# Patient Record
Sex: MALE | Race: WHITE | Employment: OTHER | URBAN - METROPOLITAN AREA
[De-identification: names, ages, dates, MRNs, and addresses within clinical notes are randomized per-mention and may not be internally consistent; named-entity substitution may affect disease eponyms.]

---

## 2017-08-25 ENCOUNTER — HOSPITAL ENCOUNTER (EMERGENCY)
Age: 47
Discharge: HOME | End: 2017-08-25
Attending: FAMILY MEDICINE

## 2017-08-25 VITALS
SYSTOLIC BLOOD PRESSURE: 141 MMHG | BODY MASS INDEX: 35.68 KG/M2 | TEMPERATURE: 97.7 F | WEIGHT: 293 LBS | OXYGEN SATURATION: 97 % | RESPIRATION RATE: 18 BRPM | HEIGHT: 76 IN | HEART RATE: 67 BPM | DIASTOLIC BLOOD PRESSURE: 95 MMHG

## 2017-08-25 DIAGNOSIS — J20.9 ACUTE BRONCHITIS, UNSPECIFIED ORGANISM: Primary | ICD-10-CM

## 2017-08-25 RX ORDER — BENZONATATE 200 MG/1
200 CAPSULE ORAL
Qty: 30 CAP | Refills: 0 | Status: SHIPPED | OUTPATIENT
Start: 2017-08-25 | End: 2018-04-17

## 2017-08-25 RX ORDER — AZITHROMYCIN 250 MG/1
TABLET, FILM COATED ORAL
Qty: 6 TAB | Refills: 0 | Status: SHIPPED | OUTPATIENT
Start: 2017-08-25 | End: 2018-04-17

## 2017-08-25 NOTE — UC PROVIDER NOTE
Patient is a 52 y.o. male presenting with cough. The history is provided by the patient. Cough   This is a new problem. Episode onset: 6 days ago. The problem occurs every few minutes. The problem has been gradually worsening. The cough is productive of sputum. There has been no fever. Associated symptoms include rhinorrhea and sore throat. Pertinent negatives include no chest pain, no chills, no sweats, no eye redness, no ear congestion, no ear pain, no headaches, no myalgias, no shortness of breath, no wheezing, no nausea and no vomiting. He has tried decongestants for the symptoms. The treatment provided no relief. He is not a smoker. History reviewed. No pertinent past medical history. History reviewed. No pertinent surgical history. History reviewed. No pertinent family history. Social History     Social History    Marital status: SINGLE     Spouse name: N/A    Number of children: N/A    Years of education: N/A     Occupational History    Not on file. Social History Main Topics    Smoking status: Never Smoker    Smokeless tobacco: Never Used    Alcohol use Not on file    Drug use: Not on file    Sexual activity: Not on file     Other Topics Concern    Not on file     Social History Narrative    No narrative on file                ALLERGIES: Review of patient's allergies indicates no known allergies. Review of Systems   Constitutional: Negative for chills and fever. HENT: Positive for congestion, rhinorrhea and sore throat. Negative for ear pain. Eyes: Negative for redness. Respiratory: Positive for cough. Negative for shortness of breath and wheezing. Cardiovascular: Negative for chest pain and palpitations. Gastrointestinal: Negative for nausea and vomiting. Musculoskeletal: Negative for myalgias. Skin: Negative for rash. Neurological: Negative for dizziness and headaches. Hematological: Negative for adenopathy.        Vitals:    08/25/17 0823   BP: (!) 141/95   Pulse: 67   Resp: 18   Temp: 97.7 °F (36.5 °C)   SpO2: 97%   Weight: 132.9 kg (293 lb)   Height: 6' 4\" (1.93 m)       Physical Exam   Constitutional: He appears well-developed and well-nourished. No distress. HENT:   Right Ear: Tympanic membrane, external ear and ear canal normal.   Left Ear: Tympanic membrane, external ear and ear canal normal.   Nose: Rhinorrhea present. Right sinus exhibits no maxillary sinus tenderness and no frontal sinus tenderness. Left sinus exhibits no maxillary sinus tenderness and no frontal sinus tenderness. Mouth/Throat: Mucous membranes are normal. Posterior oropharyngeal edema and posterior oropharyngeal erythema present. No oropharyngeal exudate or tonsillar abscesses. Cardiovascular: Normal rate, regular rhythm and normal heart sounds. Pulmonary/Chest: Effort normal and breath sounds normal. No respiratory distress. He has no wheezes. He has no rales. Lymphadenopathy:     He has no cervical adenopathy. Neurological: He is alert. Skin: He is not diaphoretic. Psychiatric: He has a normal mood and affect. His behavior is normal. Judgment and thought content normal.   Nursing note and vitals reviewed. MDM     Differential Diagnosis; Clinical Impression; Plan:     CLINICAL IMPRESSION:  Acute bronchitis, unspecified organism  (primary encounter diagnosis)    Plan:  1. Zpak  2. Tessalon prn  3. PCP if no improvement  Risk of Significant Complications, Morbidity, and/or Mortality:   Presenting problems: Moderate  Management options:   Moderate  Progress:   Patient progress:  Stable      Procedures

## 2017-08-25 NOTE — DISCHARGE INSTRUCTIONS
Bronchitis: Care Instructions  Your Care Instructions    Bronchitis is inflammation of the bronchial tubes, which carry air to the lungs. The tubes swell and produce mucus, or phlegm. The mucus and inflamed bronchial tubes make you cough. You may have trouble breathing. Most cases of bronchitis are caused by viruses like those that cause colds. Antibiotics usually do not help and they may be harmful. Bronchitis usually develops rapidly and lasts about 2 to 3 weeks in otherwise healthy people. Follow-up care is a key part of your treatment and safety. Be sure to make and go to all appointments, and call your doctor if you are having problems. It's also a good idea to know your test results and keep a list of the medicines you take. How can you care for yourself at home? · Take all medicines exactly as prescribed. Call your doctor if you think you are having a problem with your medicine. · Get some extra rest.  · Take an over-the-counter pain medicine, such as acetaminophen (Tylenol), ibuprofen (Advil, Motrin), or naproxen (Aleve) to reduce fever and relieve body aches. Read and follow all instructions on the label. · Do not take two or more pain medicines at the same time unless the doctor told you to. Many pain medicines have acetaminophen, which is Tylenol. Too much acetaminophen (Tylenol) can be harmful. · Take an over-the-counter cough medicine that contains dextromethorphan to help quiet a dry, hacking cough so that you can sleep. Avoid cough medicines that have more than one active ingredient. Read and follow all instructions on the label. · Breathe moist air from a humidifier, hot shower, or sink filled with hot water. The heat and moisture will thin mucus so you can cough it out. · Do not smoke. Smoking can make bronchitis worse. If you need help quitting, talk to your doctor about stop-smoking programs and medicines. These can increase your chances of quitting for good.   When should you call for help? Call 911 anytime you think you may need emergency care. For example, call if:  · You have severe trouble breathing. Call your doctor now or seek immediate medical care if:  · You have new or worse trouble breathing. · You cough up dark brown or bloody mucus (sputum). · You have a new or higher fever. · You have a new rash. Watch closely for changes in your health, and be sure to contact your doctor if:  · You cough more deeply or more often, especially if you notice more mucus or a change in the color of your mucus. · You are not getting better as expected. Where can you learn more? Go to http://broderick-citlalli.info/. Enter H333 in the search box to learn more about \"Bronchitis: Care Instructions. \"  Current as of: March 25, 2017  Content Version: 11.3  © 6605-2863 [employer]. Care instructions adapted under license by [employer] (which disclaims liability or warranty for this information). If you have questions about a medical condition or this instruction, always ask your healthcare professional. [address] any warranty or liability for your use of this information.

## 2018-04-17 ENCOUNTER — HOSPITAL ENCOUNTER (INPATIENT)
Age: 48
LOS: 1 days | Discharge: HOME | DRG: 176 | End: 2018-04-18
Attending: EMERGENCY MEDICINE | Admitting: HOSPITALIST
Payer: SELF-PAY

## 2018-04-17 ENCOUNTER — APPOINTMENT (OUTPATIENT)
Dept: CT IMAGING | Age: 48
DRG: 176 | End: 2018-04-17
Attending: PHYSICIAN ASSISTANT
Payer: SELF-PAY

## 2018-04-17 ENCOUNTER — APPOINTMENT (OUTPATIENT)
Dept: CT IMAGING | Age: 48
DRG: 176 | End: 2018-04-17
Attending: EMERGENCY MEDICINE
Payer: SELF-PAY

## 2018-04-17 DIAGNOSIS — I82.4Z1 ACUTE DEEP VEIN THROMBOSIS OF DISTAL LEG, RIGHT (HCC): ICD-10-CM

## 2018-04-17 DIAGNOSIS — I26.99 OTHER ACUTE PULMONARY EMBOLISM WITHOUT ACUTE COR PULMONALE (HCC): ICD-10-CM

## 2018-04-17 DIAGNOSIS — D58.2 ELEVATED HEMOGLOBIN (HCC): ICD-10-CM

## 2018-04-17 LAB
ALBUMIN SERPL-MCNC: 3.6 G/DL (ref 3.5–5)
ALBUMIN/GLOB SERPL: 1 {RATIO} (ref 1.1–2.2)
ALP SERPL-CCNC: 89 U/L (ref 45–117)
ALT SERPL-CCNC: 27 U/L (ref 12–78)
ANION GAP SERPL CALC-SCNC: 7 MMOL/L (ref 5–15)
APPEARANCE UR: ABNORMAL
APTT PPP: 27.6 SEC (ref 22.1–32)
AST SERPL-CCNC: 23 U/L (ref 15–37)
BACTERIA URNS QL MICRO: NEGATIVE /HPF
BASOPHILS # BLD: 0 K/UL (ref 0–0.1)
BASOPHILS NFR BLD: 0 % (ref 0–1)
BILIRUB SERPL-MCNC: 2 MG/DL (ref 0.2–1)
BILIRUB UR QL CFM: NEGATIVE
BUN SERPL-MCNC: 8 MG/DL (ref 6–20)
BUN/CREAT SERPL: 10 (ref 12–20)
CALCIUM SERPL-MCNC: 9.1 MG/DL (ref 8.5–10.1)
CHLORIDE SERPL-SCNC: 107 MMOL/L (ref 97–108)
CO2 SERPL-SCNC: 27 MMOL/L (ref 21–32)
COLOR UR: ABNORMAL
CREAT SERPL-MCNC: 0.84 MG/DL (ref 0.7–1.3)
DIFFERENTIAL METHOD BLD: ABNORMAL
EOSINOPHIL # BLD: 0.1 K/UL (ref 0–0.4)
EOSINOPHIL NFR BLD: 1 % (ref 0–7)
EPITH CASTS URNS QL MICRO: ABNORMAL /LPF
ERYTHROCYTE [DISTWIDTH] IN BLOOD BY AUTOMATED COUNT: 15.7 % (ref 11.5–14.5)
FACT VIII ACT/NOR PPP: 244 % (ref 80–200)
GLOBULIN SER CALC-MCNC: 3.6 G/DL (ref 2–4)
GLUCOSE SERPL-MCNC: 96 MG/DL (ref 65–100)
GLUCOSE UR STRIP.AUTO-MCNC: NEGATIVE MG/DL
HCT VFR BLD AUTO: 55.9 % (ref 36.6–50.3)
HGB BLD-MCNC: 18.2 G/DL (ref 12.1–17)
HGB UR QL STRIP: ABNORMAL
HYALINE CASTS URNS QL MICRO: ABNORMAL /LPF (ref 0–5)
IMM GRANULOCYTES # BLD: 0.1 K/UL (ref 0–0.04)
IMM GRANULOCYTES NFR BLD AUTO: 1 % (ref 0–0.5)
INR PPP: 1.1 (ref 0.9–1.1)
KETONES UR QL STRIP.AUTO: ABNORMAL MG/DL
LEUKOCYTE ESTERASE UR QL STRIP.AUTO: ABNORMAL
LYMPHOCYTES # BLD: 1 K/UL (ref 0.8–3.5)
LYMPHOCYTES NFR BLD: 10 % (ref 12–49)
MCH RBC QN AUTO: 29.5 PG (ref 26–34)
MCHC RBC AUTO-ENTMCNC: 32.6 G/DL (ref 30–36.5)
MCV RBC AUTO: 90.6 FL (ref 80–99)
MONOCYTES # BLD: 1.1 K/UL (ref 0–1)
MONOCYTES NFR BLD: 11 % (ref 5–13)
NEUTS SEG # BLD: 7.8 K/UL (ref 1.8–8)
NEUTS SEG NFR BLD: 77 % (ref 32–75)
NITRITE UR QL STRIP.AUTO: NEGATIVE
NRBC # BLD: 0 K/UL (ref 0–0.01)
NRBC BLD-RTO: 0 PER 100 WBC
PH UR STRIP: 7 [PH] (ref 5–8)
PLATELET # BLD AUTO: 168 K/UL (ref 150–400)
PMV BLD AUTO: 10.1 FL (ref 8.9–12.9)
POTASSIUM SERPL-SCNC: 4.2 MMOL/L (ref 3.5–5.1)
PROT SERPL-MCNC: 7.2 G/DL (ref 6.4–8.2)
PROT UR STRIP-MCNC: NEGATIVE MG/DL
PROTHROMBIN TIME: 11.2 SEC (ref 9–11.1)
RBC # BLD AUTO: 6.17 M/UL (ref 4.1–5.7)
RBC #/AREA URNS HPF: ABNORMAL /HPF (ref 0–5)
SODIUM SERPL-SCNC: 141 MMOL/L (ref 136–145)
SP GR UR REFRACTOMETRY: 1.02 (ref 1–1.03)
THERAPEUTIC RANGE,PTTT: NORMAL SECS (ref 58–77)
UR CULT HOLD, URHOLD: NORMAL
UROBILINOGEN UR QL STRIP.AUTO: 1 EU/DL (ref 0.2–1)
WBC # BLD AUTO: 10.2 K/UL (ref 4.1–11.1)
WBC URNS QL MICRO: ABNORMAL /HPF (ref 0–4)

## 2018-04-17 PROCEDURE — 81241 F5 GENE: CPT | Performed by: HOSPITALIST

## 2018-04-17 PROCEDURE — 81001 URINALYSIS AUTO W/SCOPE: CPT | Performed by: PHYSICIAN ASSISTANT

## 2018-04-17 PROCEDURE — 74011250636 HC RX REV CODE- 250/636: Performed by: HOSPITALIST

## 2018-04-17 PROCEDURE — 71275 CT ANGIOGRAPHY CHEST: CPT

## 2018-04-17 PROCEDURE — C8929 TTE W OR WO FOL WCON,DOPPLER: HCPCS

## 2018-04-17 PROCEDURE — 85610 PROTHROMBIN TIME: CPT | Performed by: EMERGENCY MEDICINE

## 2018-04-17 PROCEDURE — 82668 ASSAY OF ERYTHROPOIETIN: CPT | Performed by: HOSPITALIST

## 2018-04-17 PROCEDURE — 86147 CARDIOLIPIN ANTIBODY EA IG: CPT | Performed by: HOSPITALIST

## 2018-04-17 PROCEDURE — 99284 EMERGENCY DEPT VISIT MOD MDM: CPT

## 2018-04-17 PROCEDURE — 85613 RUSSELL VIPER VENOM DILUTED: CPT | Performed by: HOSPITALIST

## 2018-04-17 PROCEDURE — 81240 F2 GENE: CPT | Performed by: HOSPITALIST

## 2018-04-17 PROCEDURE — 86146 BETA-2 GLYCOPROTEIN ANTIBODY: CPT | Performed by: HOSPITALIST

## 2018-04-17 PROCEDURE — 85730 THROMBOPLASTIN TIME PARTIAL: CPT | Performed by: EMERGENCY MEDICINE

## 2018-04-17 PROCEDURE — 85025 COMPLETE CBC W/AUTO DIFF WBC: CPT | Performed by: PHYSICIAN ASSISTANT

## 2018-04-17 PROCEDURE — 81270 JAK2 GENE: CPT | Performed by: HOSPITALIST

## 2018-04-17 PROCEDURE — 96372 THER/PROPH/DIAG INJ SC/IM: CPT

## 2018-04-17 PROCEDURE — 85240 CLOT FACTOR VIII AHG 1 STAGE: CPT | Performed by: HOSPITALIST

## 2018-04-17 PROCEDURE — 74011250636 HC RX REV CODE- 250/636: Performed by: EMERGENCY MEDICINE

## 2018-04-17 PROCEDURE — 96374 THER/PROPH/DIAG INJ IV PUSH: CPT

## 2018-04-17 PROCEDURE — 93971 EXTREMITY STUDY: CPT

## 2018-04-17 PROCEDURE — 93005 ELECTROCARDIOGRAM TRACING: CPT

## 2018-04-17 PROCEDURE — 74011250637 HC RX REV CODE- 250/637: Performed by: HOSPITALIST

## 2018-04-17 PROCEDURE — 36415 COLL VENOUS BLD VENIPUNCTURE: CPT | Performed by: HOSPITALIST

## 2018-04-17 PROCEDURE — 74011000258 HC RX REV CODE- 258: Performed by: EMERGENCY MEDICINE

## 2018-04-17 PROCEDURE — 74011250636 HC RX REV CODE- 250/636: Performed by: INTERNAL MEDICINE

## 2018-04-17 PROCEDURE — 65270000032 HC RM SEMIPRIVATE

## 2018-04-17 PROCEDURE — 74011250636 HC RX REV CODE- 250/636

## 2018-04-17 PROCEDURE — 85300 ANTITHROMBIN III ACTIVITY: CPT | Performed by: HOSPITALIST

## 2018-04-17 PROCEDURE — 74177 CT ABD & PELVIS W/CONTRAST: CPT

## 2018-04-17 PROCEDURE — 74011636320 HC RX REV CODE- 636/320: Performed by: EMERGENCY MEDICINE

## 2018-04-17 PROCEDURE — 80053 COMPREHEN METABOLIC PANEL: CPT | Performed by: PHYSICIAN ASSISTANT

## 2018-04-17 RX ORDER — ENOXAPARIN SODIUM 150 MG/ML
120 INJECTION SUBCUTANEOUS
Status: COMPLETED | OUTPATIENT
Start: 2018-04-17 | End: 2018-04-17

## 2018-04-17 RX ORDER — ENOXAPARIN SODIUM 150 MG/ML
120 INJECTION SUBCUTANEOUS EVERY 12 HOURS
Status: DISCONTINUED | OUTPATIENT
Start: 2018-04-18 | End: 2018-04-18

## 2018-04-17 RX ORDER — ACETAMINOPHEN 325 MG/1
650 TABLET ORAL
Status: DISCONTINUED | OUTPATIENT
Start: 2018-04-17 | End: 2018-04-18 | Stop reason: HOSPADM

## 2018-04-17 RX ORDER — ACETAMINOPHEN 500 MG
1000 TABLET ORAL
COMMUNITY
End: 2018-04-18

## 2018-04-17 RX ORDER — SODIUM CHLORIDE 0.9 % (FLUSH) 0.9 %
10-20 SYRINGE (ML) INJECTION
Status: COMPLETED | OUTPATIENT
Start: 2018-04-17 | End: 2018-04-17

## 2018-04-17 RX ORDER — IBUPROFEN 200 MG
200 TABLET ORAL
Status: DISCONTINUED | OUTPATIENT
Start: 2018-04-17 | End: 2018-04-17 | Stop reason: CLARIF

## 2018-04-17 RX ORDER — MORPHINE SULFATE 4 MG/ML
2 INJECTION, SOLUTION INTRAMUSCULAR; INTRAVENOUS
Status: DISCONTINUED | OUTPATIENT
Start: 2018-04-17 | End: 2018-04-17 | Stop reason: CLARIF

## 2018-04-17 RX ORDER — SODIUM CHLORIDE 0.9 % (FLUSH) 0.9 %
SYRINGE (ML) INJECTION
Status: COMPLETED
Start: 2018-04-17 | End: 2018-04-17

## 2018-04-17 RX ORDER — SODIUM CHLORIDE 0.9 % (FLUSH) 0.9 %
5-10 SYRINGE (ML) INJECTION AS NEEDED
Status: DISCONTINUED | OUTPATIENT
Start: 2018-04-17 | End: 2018-04-18 | Stop reason: HOSPADM

## 2018-04-17 RX ORDER — MORPHINE SULFATE 4 MG/ML
2 INJECTION, SOLUTION INTRAMUSCULAR; INTRAVENOUS
Status: DISCONTINUED | OUTPATIENT
Start: 2018-04-17 | End: 2018-04-18

## 2018-04-17 RX ORDER — SODIUM CHLORIDE 9 MG/ML
125 INJECTION, SOLUTION INTRAVENOUS CONTINUOUS
Status: DISCONTINUED | OUTPATIENT
Start: 2018-04-17 | End: 2018-04-18

## 2018-04-17 RX ORDER — MORPHINE SULFATE 10 MG/ML
4 INJECTION, SOLUTION INTRAMUSCULAR; INTRAVENOUS
Status: COMPLETED | OUTPATIENT
Start: 2018-04-17 | End: 2018-04-17

## 2018-04-17 RX ORDER — NALOXONE HYDROCHLORIDE 0.4 MG/ML
0.4 INJECTION, SOLUTION INTRAMUSCULAR; INTRAVENOUS; SUBCUTANEOUS AS NEEDED
Status: DISCONTINUED | OUTPATIENT
Start: 2018-04-17 | End: 2018-04-18 | Stop reason: HOSPADM

## 2018-04-17 RX ORDER — OXYCODONE HYDROCHLORIDE 5 MG/1
10 TABLET ORAL
Status: DISCONTINUED | OUTPATIENT
Start: 2018-04-17 | End: 2018-04-18 | Stop reason: HOSPADM

## 2018-04-17 RX ORDER — SODIUM CHLORIDE 0.9 % (FLUSH) 0.9 %
5-10 SYRINGE (ML) INJECTION EVERY 8 HOURS
Status: DISCONTINUED | OUTPATIENT
Start: 2018-04-17 | End: 2018-04-18 | Stop reason: HOSPADM

## 2018-04-17 RX ORDER — SODIUM CHLORIDE 0.9 % (FLUSH) 0.9 %
10 SYRINGE (ML) INJECTION
Status: COMPLETED | OUTPATIENT
Start: 2018-04-17 | End: 2018-04-17

## 2018-04-17 RX ORDER — ONDANSETRON 2 MG/ML
4 INJECTION INTRAMUSCULAR; INTRAVENOUS
Status: DISCONTINUED | OUTPATIENT
Start: 2018-04-17 | End: 2018-04-18 | Stop reason: HOSPADM

## 2018-04-17 RX ORDER — DOCUSATE SODIUM 100 MG/1
100 CAPSULE, LIQUID FILLED ORAL 2 TIMES DAILY
Status: DISCONTINUED | OUTPATIENT
Start: 2018-04-17 | End: 2018-04-18 | Stop reason: HOSPADM

## 2018-04-17 RX ORDER — MORPHINE SULFATE 4 MG/ML
4 INJECTION, SOLUTION INTRAMUSCULAR; INTRAVENOUS ONCE
Status: DISCONTINUED | OUTPATIENT
Start: 2018-04-17 | End: 2018-04-17 | Stop reason: CLARIF

## 2018-04-17 RX ORDER — MORPHINE SULFATE 10 MG/ML
2 INJECTION, SOLUTION INTRAMUSCULAR; INTRAVENOUS
Status: DISCONTINUED | OUTPATIENT
Start: 2018-04-17 | End: 2018-04-17

## 2018-04-17 RX ADMIN — MORPHINE SULFATE 2 MG: 10 INJECTION, SOLUTION INTRAMUSCULAR; INTRAVENOUS at 17:37

## 2018-04-17 RX ADMIN — ENOXAPARIN SODIUM 120 MG: 120 INJECTION SUBCUTANEOUS at 14:52

## 2018-04-17 RX ADMIN — OXYCODONE HYDROCHLORIDE 10 MG: 5 TABLET ORAL at 17:52

## 2018-04-17 RX ADMIN — MORPHINE SULFATE 4 MG: 10 INJECTION, SOLUTION INTRAMUSCULAR; INTRAVENOUS at 14:59

## 2018-04-17 RX ADMIN — SODIUM CHLORIDE 100 ML: 900 INJECTION, SOLUTION INTRAVENOUS at 13:28

## 2018-04-17 RX ADMIN — IOPAMIDOL 100 ML: 755 INJECTION, SOLUTION INTRAVENOUS at 13:28

## 2018-04-17 RX ADMIN — Medication 10 ML: at 13:28

## 2018-04-17 RX ADMIN — SODIUM CHLORIDE 125 ML/HR: 900 INJECTION, SOLUTION INTRAVENOUS at 17:46

## 2018-04-17 RX ADMIN — PERFLUTREN 1.5 ML: 6.52 INJECTION, SUSPENSION INTRAVENOUS at 16:18

## 2018-04-17 RX ADMIN — Medication 10 ML: at 16:19

## 2018-04-17 RX ADMIN — MORPHINE SULFATE 2 MG: 4 INJECTION, SOLUTION INTRAMUSCULAR; INTRAVENOUS at 21:18

## 2018-04-17 RX ADMIN — OXYCODONE HYDROCHLORIDE 10 MG: 5 TABLET ORAL at 23:01

## 2018-04-17 NOTE — ED PROVIDER NOTES
HPI Comments: 50 y.o. male with past medical history significant for kidney stones who presents from [facility] with chief complaint of right flank pain. Patient complains of 1 week of nausea, 2 days of right flank pain and constant right calf pain/swelling  Patient states pain is now pleuritic and became unbearable during the night last night. Patient also reports two weeks ago he had an episode of hemoptysis, coughed up a \"considerable amount of blood. \"  Patient had a couple additional episodes last night and this morning where cough was productive of specks of blood. Patient reports recent long travel driving from [place] to [place] approximately 1 month ago. Patient reports history of kidney stones but states this pain is different, higher and pleuritic. Patient denies urinary symptoms and URI symptoms. There are no other acute medical concerns at this time. Social hx: Nonsmoker  PCP: Son Kaur MD    Note written by Le Veliz. Ciara Ballard, as dictated by Jose Alejandro Contreras MD 12:42 PM      The history is provided by the patient. Past Medical History:   Diagnosis Date    Anxiety     Kidney stone        Past Surgical History:   Procedure Laterality Date    HX TONSILLECTOMY           History reviewed. No pertinent family history. Social History     Social History    Marital status: SINGLE     Spouse name: N/A    Number of children: N/A    Years of education: N/A     Occupational History    Not on file. Social History Main Topics    Smoking status: Never Smoker    Smokeless tobacco: Never Used    Alcohol use Yes    Drug use: No    Sexual activity: Not on file     Other Topics Concern    Not on file     Social History Narrative         ALLERGIES: Review of patient's allergies indicates no known allergies. Review of Systems   Constitutional: Negative for fever. HENT: Negative for congestion. Respiratory: Positive for cough, hemoptysis and shortness of breath. Gastrointestinal: Positive for nausea. Negative for vomiting. Genitourinary: Positive for flank pain. Negative for dysuria. Musculoskeletal: Positive for myalgias (right calf pain). All other systems reviewed and are negative. Vitals:    04/17/18 1204 04/17/18 1205   BP: (!) 161/117 (!) 156/108   Pulse: 90    Resp: 18    Temp: 97.9 °F (36.6 °C)    SpO2: 95%    Weight: 118.8 kg (262 lb)             Physical Exam   Constitutional: He is oriented to person, place, and time. He appears well-developed and well-nourished. No distress. HENT:   Head: Normocephalic and atraumatic. Eyes: Conjunctivae are normal. No scleral icterus. Neck: Neck supple. No tracheal deviation present. Cardiovascular: Normal rate, regular rhythm, normal heart sounds and intact distal pulses. Exam reveals no gallop and no friction rub. No murmur heard. Pulmonary/Chest: Effort normal and breath sounds normal. He has no wheezes. He has no rales. Abdominal: Soft. He exhibits no distension. There is no tenderness. There is no rebound and no guarding. Musculoskeletal: He exhibits no edema. Neurological: He is alert and oriented to person, place, and time. Skin: Skin is warm and dry. No rash noted. Psychiatric: He has a normal mood and affect. Nursing note and vitals reviewed. Note written by Gladis Chang.  Cece Richards, as dictated by Gisell Ku MD 12:48 PM       MDM  Number of Diagnoses or Management Options     Amount and/or Complexity of Data Reviewed  Clinical lab tests: ordered and reviewed  Tests in the radiology section of CPT®: ordered and reviewed  Tests in the medicine section of CPT®: ordered and reviewed  Discussion of test results with the performing providers: yes  Obtain history from someone other than the patient: yes  Discuss the patient with other providers: yes    Total critical care time spent exclusive of procedures: 34 minutes      ED Course       Procedures    2:03 PM  CTA shows pulmonary emboli in the right upper lobe and right lower lobe. Small right lower lobe pulmonary infarction. Will start anticoagulants and admit patient to the hospitalist.  Patient is hemodynamically stable. CONSULT NOTE:  2:30 PM Pb Polanco MD spoke with Dr. Chhaya Alexis, Consult for Hospitalist.  Discussed available diagnostic tests and clinical findings. Dr. Chhaya Alexis will admit patient. ED EKG interpretation:  Rhythm: normal sinus rhythm; and regular . Rate (approx.): 84; Voltage criteria for LVH. Note written by Eddie Gaitan.  Kulwant Varner, as dictated by Pb Polanco MD 2:41 PM

## 2018-04-17 NOTE — PROCEDURES
[facility]  *** FINAL REPORT ***    Name: Shira Velez  MRN: [id number]  : 1970  HIS Order #: [id number]  [id number] [facility] Visit #: [id number]  Date: 2018    TYPE OF TEST: Peripheral Venous Testing    REASON FOR TEST  Pain in limb, Pulmonary embolism    Right Leg:-  Deep venous thrombosis:           Yes  Proximal extent of thrombus:      Popliteal Above The Knee  Superficial venous thrombosis:    No  Deep venous insufficiency:        Not examined  Superficial venous insufficiency: Not examined      INTERPRETATION/FINDINGS  PROCEDURE:  Color duplex ultrasound imaging of lower extremity veins. FINDINGS:       Right:  Consistent with thrombosis involving the popliteal and  gastroc veins as demonstrated by vein non-compressibility, and by a  narrowing or occlusion of the flow channel on color Doppler imaging. The remaining segments;  common femoral, deep femoral, femoral,  posterior tibial and great saphenous are patent and without evidence  of thrombus; each is is fully compressible and there is no narrowing  of the flow channel on color Doppler imaging. Phasic flow is observed   in the common femoral vein. The peroneal vein was not visualized. There is limited visualization of the posteror tibial vein. Left:   The common femoral vein is patent and without evidence of   thrombus. Phasic flow is observed. This extremity was not otherwise   evaluated. IMPRESSION:  There is evidence of vein thrombosis, as described above. The ultrasound appearance is more consistent with an acute than a  chronic process. Due to limited visualization of the calf veins,  isolated calf vein thrombosis cannot be completely excluded. ADDITIONAL COMMENTS    I have personally reviewed the data relevant to the interpretation of  this  study.     TECHNOLOGIST: Ney Rodriguez RVT  Signed: 2018 03:46 PM    PHYSICIAN: Zulma Eldridge MD  Signed: 2018 06:49 AM

## 2018-04-17 NOTE — PROGRESS NOTES
Admission Medication Reconciliation:    Information obtained from: Patient    Significant PMH/Disease States:   Past Medical History:   Diagnosis Date    Anxiety     Kidney stone        Chief Complaint for this Admission:  Flank pain    Allergies:  Review of patient's allergies indicates no known allergies. Prior to Admission Medications:   Prior to Admission Medications   Prescriptions Last Dose Informant Patient Reported? Taking?   acetaminophen (TYLENOL) 500 mg tablet   Yes Yes   Sig: Take 1,000 mg by mouth every six (6) hours as needed for Pain. Facility-Administered Medications: None         Comments/Recommendations: Patient states that he does not take any scheduled medications. He does state that he took 1000 mg acetaminophen around 0130 on 4/17/18 for flank pain.

## 2018-04-17 NOTE — H&P
History & Physical    Date of admission: 4/17/2018    Patient name: iJm Flores  MRN: [id number]  YOB: 1970  Age: 50 y.o. Primary care provider:  Son Kaur MD     Source of Information: patient, medical records                              Chief complain: Rt ribcage pain    History of present illness  Jim Flores is a 50 y.o. male who presents with PMHx of Anxiety, kidney stone admitted for rt sided ribcage pain. Pt states that it started 1 week ago when he started to notice rt calf swelling and pain behind his knee. Pt then noticed over the weekend that he was starting to have rt rib cage pain and worse with deep inspiration. Pt also noted to have a cough with some blood tinge sputum. Pt also complains of nausea and dizziness as well. Pt states that they drove down from [place] to [place] in mid February but has felt normal until ~1 and 1 and 1/2 weeks ago. Pt states where he works he is constantly walk and states walk roughly 4-8 miles a day. Pt denies any chest pain, vomiting, fever, chills, diarrhea, abdominal pain. Pt denies any family hx of blood clot disorder, smoking, OTC or herbal supplements. Past Medical History:   Diagnosis Date    Anxiety     Kidney stone       Past Surgical History:   Procedure Laterality Date    HX TONSILLECTOMY       Prior to Admission medications    Medication Sig Start Date End Date Taking? Authorizing Provider   acetaminophen (TYLENOL) 500 mg tablet Take 1,000 mg by mouth every six (6) hours as needed for Pain. Yes Historical Provider     No Known Allergies   History reviewed. No pertinent family history. Family history reviewed and non-contributory.      Social history  Patient resides    Independently    X  With family care      Assisted living      SNF    Ambulates  X  Independently      With cane       Assisted walker         Alcohol history   X  None     Social Chronic   Smoking history  X  None     Former smoker     Current smoker     History   Smoking Status    Never Smoker   Smokeless Tobacco    Never Used       Code status  X  Full code     DNR/DNI        Code status discussed with the patient/caregivers. Full Code    Review of systems  The patient denies any fever, chills, chest pain, cough, congestion, recent illness, palpitations, or dysuria. A comprehensive review of systems was negative except for that written in the History of Present Illness. The remainder of the review of systems was reviewed and is noncontributory. Physical Examination   Visit Vitals    BP (!) 162/91    Pulse 85    Temp 97.9 °F (36.6 °C)    Resp 29    Wt 118.8 kg (262 lb)    SpO2 95%    BMI 31.89 kg/m2          O2 Device: Room air    General:  Alert, cooperative, no distress   Head:  Normocephalic, without obvious abnormality, atraumatic   Eyes:  Conjunctivae/corneas clear. PERRL, EOMs intact   E/N/M/T: Nares normal. Septum midline.  No nasal drainage or sinus tenderness  Lips, mucosa, and tongue normal   Teeth and gums normal  Clear oropharynx   Neck: No carotid bruit   Normal JVP   Lungs:   Symmetrical chest expansion and respiratory effort  Clear to auscultation bilaterally   Chest wall:  No tenderness to palpation    Heart:  Regular rhythm   Sounds normal; no murmur, click, rub or gallop   Abdomen:   Soft, no tenderness  Bowel sounds normal     Back: No CVA tenderness   Extremities: + rt calf swelling,tender behind knee, +blayne's sign on Rt   Pulses 2+ and symmetric all extremities   Skin: No rashes or ulcers   Musculo-      skeletal: Gait not tested  Normal symmetry, ROM, strength and tone   Neuro: Normal cranial nerves  Normal reflexes and sensation   Psych: Alert, oriented x3  Normal affect, judgement and insight   Geniturinary: deferred     Data Review    EKG:  NSR 84bpm, LVH    24 Hour Results:  Recent Results (from the past 24 hour(s))   CBC WITH AUTOMATED DIFF Collection Time: 04/17/18 12:12 PM   Result Value Ref Range    WBC 10.2 4.1 - 11.1 K/uL    RBC 6.17 (H) 4.10 - 5.70 M/uL    HGB 18.2 (H) 12.1 - 17.0 g/dL    HCT 55.9 (H) 36.6 - 50.3 %    MCV 90.6 80.0 - 99.0 FL    MCH 29.5 26.0 - 34.0 PG    MCHC 32.6 30.0 - 36.5 g/dL    RDW 15.7 (H) 11.5 - 14.5 %    PLATELET 562 633 - 014 K/uL    MPV 10.1 8.9 - 12.9 FL    NRBC 0.0 0  WBC    ABSOLUTE NRBC 0.00 0.00 - 0.01 K/uL    NEUTROPHILS 77 (H) 32 - 75 %    LYMPHOCYTES 10 (L) 12 - 49 %    MONOCYTES 11 5 - 13 %    EOSINOPHILS 1 0 - 7 %    BASOPHILS 0 0 - 1 %    IMMATURE GRANULOCYTES 1 (H) 0.0 - 0.5 %    ABS. NEUTROPHILS 7.8 1.8 - 8.0 K/UL    ABS. LYMPHOCYTES 1.0 0.8 - 3.5 K/UL    ABS. MONOCYTES 1.1 (H) 0.0 - 1.0 K/UL    ABS. EOSINOPHILS 0.1 0.0 - 0.4 K/UL    ABS. BASOPHILS 0.0 0.0 - 0.1 K/UL    ABS. IMM. GRANS. 0.1 (H) 0.00 - 0.04 K/UL    DF AUTOMATED     METABOLIC PANEL, COMPREHENSIVE    Collection Time: 04/17/18 12:12 PM   Result Value Ref Range    Sodium 141 136 - 145 mmol/L    Potassium 4.2 3.5 - 5.1 mmol/L    Chloride 107 97 - 108 mmol/L    CO2 27 21 - 32 mmol/L    Anion gap 7 5 - 15 mmol/L    Glucose 96 65 - 100 mg/dL    BUN 8 6 - 20 MG/DL    Creatinine 0.84 0.70 - 1.30 MG/DL    BUN/Creatinine ratio 10 (L) 12 - 20      GFR est AA >60 >60 ml/min/1.73m2    GFR est non-AA >60 >60 ml/min/1.73m2    Calcium 9.1 8.5 - 10.1 MG/DL    Bilirubin, total 2.0 (H) 0.2 - 1.0 MG/DL    ALT (SGPT) 27 12 - 78 U/L    AST (SGOT) 23 15 - 37 U/L    Alk.  phosphatase 89 45 - 117 U/L    Protein, total 7.2 6.4 - 8.2 g/dL    Albumin 3.6 3.5 - 5.0 g/dL    Globulin 3.6 2.0 - 4.0 g/dL    A-G Ratio 1.0 (L) 1.1 - 2.2     URINALYSIS W/MICROSCOPIC    Collection Time: 04/17/18 12:12 PM   Result Value Ref Range    Color DARK YELLOW      Appearance CLOUDY (A) CLEAR      Specific gravity 1.019 1.003 - 1.030      pH (UA) 7.0 5.0 - 8.0      Protein NEGATIVE  NEG mg/dL    Glucose NEGATIVE  NEG mg/dL    Ketone TRACE (A) NEG mg/dL    Blood SMALL (A) NEG Urobilinogen 1.0 0.2 - 1.0 EU/dL    Nitrites NEGATIVE  NEG      Leukocyte Esterase TRACE (A) NEG      WBC 0-4 0 - 4 /hpf    RBC 20-50 0 - 5 /hpf    Epithelial cells FEW FEW /lpf    Bacteria NEGATIVE  NEG /hpf    Hyaline cast 0-2 0 - 5 /lpf   URINE CULTURE HOLD SAMPLE    Collection Time: 04/17/18 12:12 PM   Result Value Ref Range    Urine culture hold        URINE ON HOLD IN MICROBIOLOGY DEPT FOR 3 DAYS. IF UNPRESERVED URINE IS SUBMITTED, IT CANNOT BE USED FOR ADDITIONAL TESTING AFTER 24 HRS, RECOLLECTION WILL BE REQUIRED. BILIRUBIN, CONFIRM    Collection Time: 04/17/18 12:12 PM   Result Value Ref Range    Bilirubin UA, confirm NEGATIVE  NEG     PROTHROMBIN TIME + INR    Collection Time: 04/17/18 12:12 PM   Result Value Ref Range    INR 1.1 0.9 - 1.1      Prothrombin time 11.2 (H) 9.0 - 11.1 sec   PTT    Collection Time: 04/17/18 12:12 PM   Result Value Ref Range    aPTT 27.6 22.1 - 32.0 sec    aPTT, therapeutic range     58.0 - 77.0 SECS   EKG, 12 LEAD, INITIAL    Collection Time: 04/17/18  2:37 PM   Result Value Ref Range    Ventricular Rate 84 BPM    Atrial Rate 84 BPM    P-R Interval 156 ms    QRS Duration 92 ms    Q-T Interval 352 ms    QTC Calculation (Bezet) 415 ms    Calculated P Axis 27 degrees    Calculated R Axis -10 degrees    Calculated T Axis 16 degrees    Diagnosis       Normal sinus rhythm  Voltage criteria for left ventricular hypertrophy  No previous ECGs available       Recent Labs      04/17/18   1212   WBC  10.2   HGB  18.2*   HCT  55.9*   PLT  168     Recent Labs      04/17/18   1212   NA  141   K  4.2   CL  107   CO2  27   GLU  96   BUN  8   CREA  0.84   CA  9.1   ALB  3.6   TBILI  2.0*   SGOT  23   ALT  27   INR  1.1       Imaging  CTA chest/abd/pel:  Positive pulmonary emboli in the right upper lobe and right lower lobe. Small right lower lobe pulmonary infarction.     IMPRESSION:  Bilateral nonobstructing renal calculi  Bilateral renal hypodensities, too small to further characterize. Right lower lobe pulmonary embolism with pulmonary infarction.     Assessment and Plan   Active Problems:    Pulmonary infarct ([address] (4/17/2018)      Acute pulmonary embolism (HCC) (4/17/2018)      Elevated hemoglobin (HCC) (4/17/2018)      Acute deep vein thrombosis of distal leg, right (HCC) (4/17/2018)      Acute Pulmonary Embolism with Pulmonary Infarct  - Appears to be unprovoked at this time  - Lovenox BID  - ECHO EF 60-65%,   - Pain mananagement  - IVF  - Hypercoagulable work up ordered    Acute DVT RLE   - on Lovenox    Elevated Hemoglobin, concern for Polycythemia  - Hematology consulted  - f/u EPO and Gabino-2 Mutation serology     Elevated BP without Dx of Hypertension  - possibly stress and pain induced  - monitor  - may have to treat if continues to be elevated  - will monitor for now      Diet: Regular  Activity: as tolerated   DVT prophylaxis: on Lovenox  Isolation precautions: none  Consultations: Hematology   Anticipated disposition: 1-2 days       Signed by: Marlyn Bonilla MD    April 17, 2018 at 5:27 PM

## 2018-04-17 NOTE — IP AVS SNAPSHOT
[address] 
[phone] Patient: Maria Esther Pavon MRN: [id number] KFT:7/7/4735 You are allergic to the following No active allergies Recent Documentation Weight BMI Smoking Status 118.8 kg 31.89 kg/m2 Never Smoker Unresulted Labs-Please follow up with your PCP about these lab tests Order Current Status ANTITHROMBIN III ACTIVITY In process BETA-2 GLYCOPROTEIN I AB, IGA In process BETA-2 GLYCOPROTEIN I ABS In process CARDIOLIPIN AB PANEL In process ERYTHROPOIETIN In process FACTOR II  DNA ANALYSIS In process FACTOR V LEIDEN In process JAK2 MUTATION ANALYSIS In process LUPUS ANTICOAGULANT PANEL W/ REFLEX In process Emergency Contacts  (Rel.) Home Phone Work Phone Mobile Phone Drew Rothman -- -- --  
 Veronique Nicholas (Spouse) -- -- [phone] About your hospitalization You were admitted on:  April 17, 2018 You last received care in the:  [facility] You were discharged on:  April 18, 2018 Why you were hospitalized Your primary diagnosis was:  Not on File Your diagnoses also included:  Pulmonary Infarct (Hcc), Acute Pulmonary Embolism (Hcc), Elevated Hemoglobin (Hcc), Acute Deep Vein Thrombosis Of Distal Leg, Right (Hcc) Providers Seen During Your Hospitalization Provider Specialty Primary office phone Dayami Gonzalez MD Emergency Medicine [phone] Titus Sullivan MD Internal Medicine [phone] Your Primary Care Physician (PCP) Primary Care Physician Office Phone Office Fax OTHER, PHYS ** None ** ** None ** Follow-up Information Follow up With Details Comments Contact Info [address] on 4/30/2018 New PCP appointment on Monday April 30,2018 @ 2:15 p.m. If patient is unable to attend, please call the office. Bright Kincaid [address] [place] [address] 
[phone] Deyanira Mir MD Schedule an appointment as soon as possible for a visit in 1 week [address] [facility] 209 [address] [place] [address] 
[phone] [facility]    [phone]  
 
call with any questions concerning the Care Card My Medications STOP taking these medications   
 acetaminophen 500 mg tablet Commonly known as:  TYLENOL  
   
  
  
TAKE these medications as instructed Instructions Each Dose to Equal  
 Morning Noon Evening Bedtime * apixaban 5 mg (74 tabs) starter pack Commonly known as:  Pritchett Sheets Your last dose was: Your next dose is: Take 10 mg (two 5 mg tablets) by mouth twice a day for 7 days  Followed by 5 mg (one 5 mg tablet) by mouth twice a day * apixaban 5 mg tablet Commonly known as:  Pritchett Sheets Your last dose was: Your next dose is: Take 1 Tab by mouth two (2) times a day. Begin this prescription once you have completed your original dosepak. Discuss the need for additional treatment with Heamtology 5 mg  
    
   
   
   
  
 oxyCODONE IR 10 mg Tab immediate release tablet Commonly known as:  Mike Hinkle Your last dose was: Your next dose is: Take 1 Tab by mouth every four (4) hours as needed. Max Daily Amount: 60 mg.  
 10 mg  
    
   
   
   
  
 * Notice: This list has 2 medication(s) that are the same as other medications prescribed for you. Read the directions carefully, and ask your doctor or other care provider to review them with you. Where to Get Your Medications Information on where to get these meds will be given to you by the nurse or doctor. ! Ask your nurse or doctor about these medications  
  apixaban 5 mg (74 tabs) starter pack  
 apixaban 5 mg tablet  
 oxyCODONE IR 10 mg Tab immediate release tablet Discharge Instructions Discharge Instructions PATIENT ID: Devi Gardner MRN: [id number] YOB: 1970 DATE OF ADMISSION: 4/17/2018 12:23 PM   
DATE OF DISCHARGE: 4/18/2018 PRIMARY CARE PROVIDER: Son Kaur MD  
 
 
DISCHARGING PHYSICIAN: Debo Cat NP To contact this individual call [phone] and ask the  to page. If unavailable ask to be transferred the Adult Hospitalist Department. DISCHARGE DIAGNOSES DVT, PE with Pulmonary Infarct CONSULTATIONS: IP CONSULT TO HEMATOLOGY PROCEDURES/SURGERIES: * No surgery found * PENDING TEST RESULTS:  
At the time of discharge the following test results are still pending: na 
 
FOLLOW UP APPOINTMENTS:  
Follow-up Information Follow up With Details Comments Contact Info [address] [facility] Pam on 4/30/2018 New PCP appointment on Monday April 30,2018 @ 2:15 p.m. If patient is unable to attend, please call the office. [address] [place] [address] 
[phone] Yolie Brown MD Schedule an appointment as soon as possible for a visit in 1 week [address] 
[phone] ADDITIONAL CARE RECOMMENDATIONS:  
1.I have prescribed you Eliquis, which is a blood thinner for your leg DVT and your PE. 2. You will need to follow the instructions in the dosepak for the first month and then start the second and third month. Discuss length of treatment with the hematologist. 
3. You will need to follow up with hematology for follow up of your lab work up. 4. You are at risk for bleeding on a anticoagulant medication. Avoid trauma or high risk behaviors while on this medicine 5. Pain medication has been ordered DIET: resume ACTIVITY: resume WOUND CARE: na 
 
EQUIPMENT needed: prescriptions and Eliquis coupon book DISCHARGE MEDICATIONS: 
 See Medication Reconciliation Form · It is important that you take the medication exactly as they are prescribed. · Keep your medication in the bottles provided by the pharmacist and keep a list of the medication names, dosages, and times to be taken in your wallet. · Do not take other medications without consulting your doctor. NOTIFY YOUR PHYSICIAN FOR ANY OF THE FOLLOWING:  
Fever over 101 degrees for 24 hours. Chest pain, shortness of breath, fever, chills, nausea, vomiting, diarrhea, change in mentation, falling, weakness, bleeding. Severe pain or pain not relieved by medications. Or, any other signs or symptoms that you may have questions about. DISPOSITION: 
  Home With: 
 OT  PT  HH  RN  
  
 SNF/Inpatient Rehab/LTAC  
x Independent/assisted living Hospice Other: CDMP Checked:  
Yes x Signed:  
Mansi Ayala NP 
4/18/2018 10:46 AM 
 
Discharge Orders None [employer] Announcement We are excited to announce that we are making your provider's discharge notes available to you in [employer]. You will see these notes when they are completed and signed by the physician that discharged you from your recent hospital stay. If you have any questions or concerns about any information you see in [employer], please call the Health Information Department where you were seen or reach out to your Primary Care Provider for more information about your plan of care. Introducing [facility]! Viviana Green introduces [employer] patient portal. Now you can access parts of your medical record, email your doctor's office, and request medication refills online. 1. In your internet browser, go to [email or web address] 2. Click on the First Time User? Click Here link in the Sign In box. You will see the New Member Sign Up page. 3. Enter your [employer] Access Code exactly as it appears below. You will not need to use this code after youve completed the sign-up process. If you do not sign up before the expiration date, you must request a new code. · [employer] Access Code: [email or web address] Expires: 7/16/2018 12:00 PM 
 
4. Enter the last four digits of your Social Security Number (xxxx) and Date of Birth (mm/dd/yyyy) as indicated and click Submit. You will be taken to the next sign-up page. 5. Create a [employer] ID. This will be your [employer] login ID and cannot be changed, so think of one that is secure and easy to remember. 6. Create a [employer] password. You can change your password at any time. 7. Enter your Password Reset Question and Answer. This can be used at a later time if you forget your password. 8. Enter your e-mail address. You will receive e-mail notification when new information is available in [address] 9. Click Sign Up. You can now view and download portions of your medical record. 10. Click the Download Summary menu link to download a portable copy of your medical information. If you have questions, please visit the Frequently Asked Questions section of the [employer] website. Remember, [employer] is NOT to be used for urgent needs. For medical emergencies, dial 911. Now available from your iPhone and Android! General Information Please provide this summary of care documentation to your next provider. Patient Signature:  ____________________________________________________________ Date:  ____________________________________________________________  
  
Wilfrido Cart Provider Signature:  ____________________________________________________________ Date:  ____________________________________________________________

## 2018-04-17 NOTE — ED NOTES

## 2018-04-17 NOTE — CONSULTS
[facility] at [address]  [address] [place] [address], [address] [place]: [phone]  F: [phone]    Reason for Visit:   Diaz Gerardo is a 50 y.o. male who is seen in consultation at the request of Dr. Marita Melendez for evaluation of DVT/PE. Treatment History:   · CTA 4/17/18 with pulmonary emboli of right lung  · CT of abd/pelvis 4/17/18 with bilateral non-obstructing renal calculi, no other acute abnormality. · Doppler right leg 4/17/18 consistent with acute DVT right popliteal vein. History of Present Illness:   Mr. Swetha Aguirre is 50 y.o. male with no significant past medical history who presented to ED with c/o one week history of right sided flank pain which worsened in severity over the last 3 days. Associated with nausea, low appetite. Has also noticed in early Feb right leg discomfort behind right knee with associated leg weakness. Denies shortness of breath, chest pain, dizziness or headache. No recent falls, injury, broken bones or trauma. No periods of prolonged immobilization. Does endorse pain in right side of chest with deep breathing. Travels for work frequently. Has flown back and forth from [facility] (4 hours total travel time) every 2 weeks from August to Feb. Around Feb 19th he drove back and forth from [place]. Noticed pain in right leg prior to [place] trip. Active with work, works full time. Walks 4-9 miles per day on the job. Has twin brother who is healthy. Sister, age 48, has pacemaker and cardiac illness thought to be related to heavy smoking. Has 3 children ages 7-14 who are all healthy. No personal history of blood clots, easily bruising or bleeding. Denies blood in urine or stool. No change in bowel habits or recent weight loss. No dark/black or blood in stools. History of tonsillectomy as a child without incident. Drinks alcohol about 2-3 glasses of wine 2-3 days a week. Denies active smoking or illicit drug use.  Denies use of testosterone replacement therapy. Past Medical History:   Diagnosis Date    Anxiety     Kidney stone       Past Surgical History:   Procedure Laterality Date    HX TONSILLECTOMY        Social History   Substance Use Topics    Smoking status: Never Smoker    Smokeless tobacco: Never Used    Alcohol use Yes      History reviewed. No pertinent family history. Current Facility-Administered Medications   Medication Dose Route Frequency    sodium chloride (NS) flush 5-10 mL  5-10 mL IntraVENous Q8H    sodium chloride (NS) flush 5-10 mL  5-10 mL IntraVENous PRN    0.9% sodium chloride infusion  125 mL/hr IntraVENous CONTINUOUS    enoxaparin (LOVENOX) injection 120 mg  1 mg/kg SubCUTAneous Q12H    acetaminophen (TYLENOL) tablet 650 mg  650 mg Oral Q6H PRN    oxyCODONE IR (ROXICODONE) tablet 10 mg  10 mg Oral Q4H PRN    morphine injection 2 mg  2 mg IntraVENous Q6H PRN    naloxone (NARCAN) injection 0.4 mg  0.4 mg IntraVENous PRN    ondansetron (ZOFRAN) injection 4 mg  4 mg IntraVENous Q4H PRN    docusate sodium (COLACE) capsule 100 mg  100 mg Oral BID    perflutren lipid microspheres (DEFINITY) in NS bolus IV  1.5 mL IntraVENous RAD ONCE     Current Outpatient Prescriptions   Medication Sig    acetaminophen (TYLENOL) 500 mg tablet Take 1,000 mg by mouth every six (6) hours as needed for Pain. No Known Allergies     Review of Systems: A complete review of systems was obtained, negative except as described above.     Physical Exam:     Visit Vitals    BP (!) 162/91    Pulse 85    Temp 97.9 °F (36.6 °C)    Resp 29    Wt 262 lb (118.8 kg)    SpO2 95%    BMI 31.89 kg/m2     ECOG PS: 0  General: No distress  Eyes: PERRLA, anicteric sclerae  HENT: Atraumatic, OP clear  Neck: Supple  Lymphatic: No cervical, supraclavicular adenopathy  Respiratory: CTAB, normal respiratory effort  CV: Normal rate, regular rhythm, no peripheral edema noted  GI: Soft, nontender, nondistended  MS: Digits without clubbing or cyanosis. Skin: No rashes, ecchymoses, or petechiae. Normal temperature, turgor, and texture. Psych: Alert, oriented, appropriate affect, normal judgment/insight    Results:     Lab Results   Component Value Date/Time    WBC 10.2 04/17/2018 12:12 PM    HGB 18.2 (H) 04/17/2018 12:12 PM    HCT 55.9 (H) 04/17/2018 12:12 PM    PLATELET 422 34/31/1812 12:12 PM    MCV 90.6 04/17/2018 12:12 PM    ABS. NEUTROPHILS 7.8 04/17/2018 12:12 PM     Lab Results   Component Value Date/Time    Sodium 141 04/17/2018 12:12 PM    Potassium 4.2 04/17/2018 12:12 PM    Chloride 107 04/17/2018 12:12 PM    CO2 27 04/17/2018 12:12 PM    Glucose 96 04/17/2018 12:12 PM    BUN 8 04/17/2018 12:12 PM    Creatinine 0.84 04/17/2018 12:12 PM    GFR est AA >60 04/17/2018 12:12 PM    GFR est non-AA >60 04/17/2018 12:12 PM    Calcium 9.1 04/17/2018 12:12 PM     Lab Results   Component Value Date/Time    Bilirubin, total 2.0 (H) 04/17/2018 12:12 PM    ALT (SGPT) 27 04/17/2018 12:12 PM    AST (SGOT) 23 04/17/2018 12:12 PM    Alk. phosphatase 89 04/17/2018 12:12 PM    Protein, total 7.2 04/17/2018 12:12 PM    Albumin 3.6 04/17/2018 12:12 PM    Globulin 3.6 04/17/2018 12:12 PM     Records reviewed and summarized above. Radiology report(s) reviewed above. Assessment:   1) DVT/PE    Reviewed results of CTs and doppler with patient. Acute DVT of right leg and two PE of right lung. Leading up to the blood clot, he had recent history travel. Short duration, but frequent travel. No surgery, no trauma, or immobilization. This may be considered a provoked venous thromboembolism. though travel generally is a weak trigger. Per the 2012 ACCP guidelines for Antithrombotic Therapy for VTE Disease, will continue with 3 months of anticoagulation (if not longer) as long as the benefits of anticoagulation outweigh the risks and as long as his pending hypercoag work up is unrevealing. CT CAP was reviewed.  No obvious malignancy    2) Family history of sudden death  Family  unexpectedly and sudden at age of 47. Hypercoagulable work up pending  Protein C and S after 3 months of AC    3) Erythrocytosis. Likely secondary to acute PE and resulting hypoxia  No prior counts to compare with  Reasonable to r/o MPN  Noted that JAK2 and Erythropoietin has been ordered by Dr. Nya Gallegos     4) Hematuria, microscopic    Due to bilateral renal calculi. This can also explain right flank pain and nausea. 5) Pleuritic pain with deep breathing and right calf pain. Due to PE/DVT. Expect improvement on anticoagulation. Continue PRN medications, short course. Plan:     · May transition to Eliquis 10 mg BID X 7 days and then 5 mg BID for atleast 3 months  · Patient does not have insurance. Will need case management for assistance with medication management post discharge. Needs a plan for ability to obtain medications prior to discharge. · Noted hypercoagulable work up is sent  · JAK2 pending  · Continue primary health maintenance with PCP follow up-colonoscopy at age 48, flu vaccine yearly and yearly prostate exams. · Follow up with hematology in 3 months    I appreciate the opportunity to participate in Mr. Dempsey Lg Duran for discharge from hematology perspective.      Patient was seen with Santiago Barth NP        Signed By: Mustapha Burgess MD

## 2018-04-17 NOTE — ED TRIAGE NOTES
C/o right flank pain, right leg pain, coughing up specs of blood, and nausea. Sent by Pt. First, was told he might have kidney stone.

## 2018-04-18 VITALS
BODY MASS INDEX: 31.89 KG/M2 | WEIGHT: 262 LBS | SYSTOLIC BLOOD PRESSURE: 136 MMHG | TEMPERATURE: 98.4 F | OXYGEN SATURATION: 94 % | RESPIRATION RATE: 16 BRPM | DIASTOLIC BLOOD PRESSURE: 79 MMHG | HEART RATE: 79 BPM

## 2018-04-18 LAB
ANION GAP SERPL CALC-SCNC: 6 MMOL/L (ref 5–15)
ATRIAL RATE: 84 BPM
BASOPHILS # BLD: 0 K/UL (ref 0–0.1)
BASOPHILS NFR BLD: 0 % (ref 0–1)
BUN SERPL-MCNC: 8 MG/DL (ref 6–20)
BUN/CREAT SERPL: 12 (ref 12–20)
CALCIUM SERPL-MCNC: 8.6 MG/DL (ref 8.5–10.1)
CALCULATED P AXIS, ECG09: 27 DEGREES
CALCULATED R AXIS, ECG10: -10 DEGREES
CALCULATED T AXIS, ECG11: 16 DEGREES
CHLORIDE SERPL-SCNC: 105 MMOL/L (ref 97–108)
CO2 SERPL-SCNC: 26 MMOL/L (ref 21–32)
CREAT SERPL-MCNC: 0.68 MG/DL (ref 0.7–1.3)
DIAGNOSIS, 93000: NORMAL
DIFFERENTIAL METHOD BLD: ABNORMAL
EOSINOPHIL # BLD: 0.2 K/UL (ref 0–0.4)
EOSINOPHIL NFR BLD: 2 % (ref 0–7)
ERYTHROCYTE [DISTWIDTH] IN BLOOD BY AUTOMATED COUNT: 15.1 % (ref 11.5–14.5)
GLUCOSE SERPL-MCNC: 108 MG/DL (ref 65–100)
HCT VFR BLD AUTO: 50.1 % (ref 36.6–50.3)
HGB BLD-MCNC: 15.8 G/DL (ref 12.1–17)
IMM GRANULOCYTES # BLD: 0.1 K/UL (ref 0–0.04)
IMM GRANULOCYTES NFR BLD AUTO: 1 % (ref 0–0.5)
LYMPHOCYTES # BLD: 1.3 K/UL (ref 0.8–3.5)
LYMPHOCYTES NFR BLD: 14 % (ref 12–49)
MAGNESIUM SERPL-MCNC: 1.8 MG/DL (ref 1.6–2.4)
MCH RBC QN AUTO: 29.1 PG (ref 26–34)
MCHC RBC AUTO-ENTMCNC: 31.5 G/DL (ref 30–36.5)
MCV RBC AUTO: 92.3 FL (ref 80–99)
MONOCYTES # BLD: 1.1 K/UL (ref 0–1)
MONOCYTES NFR BLD: 12 % (ref 5–13)
NEUTS SEG # BLD: 6.6 K/UL (ref 1.8–8)
NEUTS SEG NFR BLD: 71 % (ref 32–75)
NRBC # BLD: 0 K/UL (ref 0–0.01)
NRBC BLD-RTO: 0 PER 100 WBC
P-R INTERVAL, ECG05: 156 MS
PHOSPHATE SERPL-MCNC: 3.8 MG/DL (ref 2.6–4.7)
PLATELET # BLD AUTO: 151 K/UL (ref 150–400)
PMV BLD AUTO: 9.7 FL (ref 8.9–12.9)
POTASSIUM SERPL-SCNC: 3.8 MMOL/L (ref 3.5–5.1)
Q-T INTERVAL, ECG07: 352 MS
QRS DURATION, ECG06: 92 MS
QTC CALCULATION (BEZET), ECG08: 415 MS
RBC # BLD AUTO: 5.43 M/UL (ref 4.1–5.7)
SODIUM SERPL-SCNC: 137 MMOL/L (ref 136–145)
VENTRICULAR RATE, ECG03: 84 BPM
WBC # BLD AUTO: 9.3 K/UL (ref 4.1–11.1)

## 2018-04-18 PROCEDURE — 84100 ASSAY OF PHOSPHORUS: CPT | Performed by: HOSPITALIST

## 2018-04-18 PROCEDURE — 74011250636 HC RX REV CODE- 250/636: Performed by: HOSPITALIST

## 2018-04-18 PROCEDURE — 83735 ASSAY OF MAGNESIUM: CPT | Performed by: HOSPITALIST

## 2018-04-18 PROCEDURE — 85025 COMPLETE CBC W/AUTO DIFF WBC: CPT | Performed by: HOSPITALIST

## 2018-04-18 PROCEDURE — 80048 BASIC METABOLIC PNL TOTAL CA: CPT | Performed by: HOSPITALIST

## 2018-04-18 PROCEDURE — 74011250637 HC RX REV CODE- 250/637: Performed by: HOSPITALIST

## 2018-04-18 PROCEDURE — 74011250637 HC RX REV CODE- 250/637: Performed by: NURSE PRACTITIONER

## 2018-04-18 PROCEDURE — 74011250636 HC RX REV CODE- 250/636: Performed by: INTERNAL MEDICINE

## 2018-04-18 PROCEDURE — 36415 COLL VENOUS BLD VENIPUNCTURE: CPT | Performed by: HOSPITALIST

## 2018-04-18 RX ORDER — OXYCODONE HYDROCHLORIDE 10 MG/1
10 TABLET ORAL
Qty: 30 TAB | Refills: 0 | Status: SHIPPED | OUTPATIENT
Start: 2018-04-18 | End: 2018-04-27 | Stop reason: SDUPTHER

## 2018-04-18 RX ADMIN — OXYCODONE HYDROCHLORIDE 10 MG: 5 TABLET ORAL at 13:25

## 2018-04-18 RX ADMIN — ENOXAPARIN SODIUM 120 MG: 120 INJECTION SUBCUTANEOUS at 04:05

## 2018-04-18 RX ADMIN — Medication 10 ML: at 13:25

## 2018-04-18 RX ADMIN — Medication 10 ML: at 06:03

## 2018-04-18 RX ADMIN — OXYCODONE HYDROCHLORIDE 10 MG: 5 TABLET ORAL at 18:05

## 2018-04-18 RX ADMIN — OXYCODONE HYDROCHLORIDE 10 MG: 5 TABLET ORAL at 09:30

## 2018-04-18 RX ADMIN — SODIUM CHLORIDE 125 ML/HR: 900 INJECTION, SOLUTION INTRAVENOUS at 02:00

## 2018-04-18 RX ADMIN — APIXABAN 10 MG: 5 TABLET, FILM COATED ORAL at 18:05

## 2018-04-18 RX ADMIN — DOCUSATE SODIUM 100 MG: 100 CAPSULE, LIQUID FILLED ORAL at 18:05

## 2018-04-18 RX ADMIN — OXYCODONE HYDROCHLORIDE 10 MG: 5 TABLET ORAL at 03:35

## 2018-04-18 RX ADMIN — DOCUSATE SODIUM 100 MG: 100 CAPSULE, LIQUID FILLED ORAL at 09:30

## 2018-04-18 RX ADMIN — MORPHINE SULFATE 2 MG: 4 INJECTION, SOLUTION INTRAMUSCULAR; INTRAVENOUS at 02:09

## 2018-04-18 NOTE — PROGRESS NOTES
Spiritual Care Partner Volunteer visited patient in room 624/02 on 4.18.18. Documented by: : Rev. Bi Burger.  Patsy Garcia; [facility], to contact [phone] call: [phone]

## 2018-04-18 NOTE — PROGRESS NOTES
Bedside shift change report given to barbi (oncoming nurse) by Justin Kraus (offgoing nurse). Report included the following information SBAR, Kardex and MAR.

## 2018-04-18 NOTE — DISCHARGE INSTRUCTIONS
Discharge Instructions       PATIENT ID: Ebony Reilly  MRN: [id number]   YOB: 1970    DATE OF ADMISSION: 4/17/2018 12:23 PM    DATE OF DISCHARGE: 4/18/2018    PRIMARY CARE PROVIDER: Waleska Barrios MD       DISCHARGING PHYSICIAN: Esteban Steve NP    To contact this individual call [phone] and ask the  to page. If unavailable ask to be transferred the Adult Hospitalist Department. DISCHARGE DIAGNOSES DVT, PE with Pulmonary Infarct    CONSULTATIONS: IP CONSULT TO HEMATOLOGY    PROCEDURES/SURGERIES: * No surgery found *    PENDING TEST RESULTS:   At the time of discharge the following test results are still pending: na    FOLLOW UP APPOINTMENTS:   Follow-up Information     Follow up With Details Comments Contact [facility] Go on 4/30/2018 New PCP appointment on Monday April 30,2018 @ 2:15 p.m. If patient is unable to attend, please call the office. [address] [facility]    Dori Patel MD Schedule an appointment as soon as possible for a visit in 1 week [facility] [address] [facility]  [phone]             ADDITIONAL CARE RECOMMENDATIONS:   1.I have prescribed you Eliquis, which is a blood thinner for your leg DVT and your PE. 2. You will need to follow the instructions in the dosepak for the first month and then start the second and third month. Discuss length of treatment with the hematologist.  3. You will need to follow up with hematology for follow up of your lab work up. 4. You are at risk for bleeding on a anticoagulant medication. Avoid trauma or high risk behaviors while on this medicine  5. Pain medication has been ordered    DIET: resume     ACTIVITY: resume    WOUND CARE: na    EQUIPMENT needed: prescriptions and Eliquis coupon book      DISCHARGE MEDICATIONS:   See Medication Reconciliation Form    · It is important that you take the medication exactly as they are prescribed. · Keep your medication in the bottles provided by the pharmacist and keep a list of the medication names, dosages, and times to be taken in your wallet. · Do not take other medications without consulting your doctor. NOTIFY YOUR PHYSICIAN FOR ANY OF THE FOLLOWING:   Fever over 101 degrees for 24 hours. Chest pain, shortness of breath, fever, chills, nausea, vomiting, diarrhea, change in mentation, falling, weakness, bleeding. Severe pain or pain not relieved by medications. Or, any other signs or symptoms that you may have questions about.       DISPOSITION:    Home With:   OT  PT  HH  RN       SNF/Inpatient Rehab/LTAC   x Independent/assisted living    Hospice    Other:     CDMP Checked:   Yes x       Signed:   Cristina Faye NP  4/18/2018  10:46 AM

## 2018-04-18 NOTE — PROGRESS NOTES
Reason for Admission:   PE                   RRAT Score:          6           Plan for utilizing home health:      none                    Likelihood of Readmission:  low                         Transition of Care Plan:          Home    Patient has been admitted due to PE. Patient will be discharged home on Eliquis and has the 30 day free card. Patient verified demographics and that he has no PCP for he just moved to [place]. Patient is fully independent. The specialist obtained a new patient appointment with Cheng Hamzah 4/30. Patient has received the information regarding the Care Card and free clinics. Patient stated he will have health insurance through his employer in a few weeks. Patient has the information on how to apply for financial assistance with the pharmaceutical company for Eliquis.      Care Management Interventions  PCP Verified by CM: No (Just moved to [place] and has no PCP)  Jayro Signup: No  Discharge Durable Medical Equipment: No  Physical Therapy Consult: No  Occupational Therapy Consult: No  Speech Therapy Consult: No  Current Support Network: Own Home  Confirm Follow Up Transport: Family  Plan discussed with Pt/Family/Caregiver: Yes  Freedom of Choice Offered: Yes  Discharge Location  Discharge Placement: Home          [phone]

## 2018-04-18 NOTE — DISCHARGE SUMMARY
Discharge Summary       PATIENT ID: Promise Mcghee  MRN: [id number]   YOB: 1970    DATE OF ADMISSION: 4/17/2018 12:23 PM    DATE OF DISCHARGE: 4/18/2018   PRIMARY CARE PROVIDER: Jessica Arevalo MD       DISCHARGING PHYSICIAN: Adonis Bird NP    To contact this individual call [phone] and ask the  to page. If unavailable ask to be transferred the Adult Hospitalist Department. CONSULTATIONS: IP CONSULT TO HEMATOLOGY    PROCEDURES/SURGERIES: * No surgery found *    ADMITTING DIAGNOSES & HOSPITAL COURSE:     Promise Mcghee is a 50 y.o. male who presents with PMHx of Anxiety, kidney stone admitted for rt sided ribcage pain. Pt states that it started 1 week ago when he started to notice rt calf swelling and pain behind his knee. Pt then noticed over the weekend that he was starting to have rt rib cage pain and worse with deep inspiration. Pt also noted to have a cough with some blood tinge sputum. Pt also complains of nausea and dizziness as well. Pt states that they drove down from [place] to [place] in mid February but has felt normal until ~1 and 1 and 1/2 weeks ago. Pt states where he works he is constantly walk and states walk roughly 4-8 miles a day. Pt denies any chest pain, vomiting, fever, chills, diarrhea, abdominal pain. Pt denies any family hx of blood clot disorder, smoking, OTC or herbal supplements. He denies steroids or testosterone supplements. He was weaned off oxygen and echo was ordered and reviewed. Case management was consulted for help with PCP fu, Care card, and Eliquis coverage.  He was discharged in stable condition with outpatient follow up to home.         DISCHARGE DIAGNOSES / PLAN:      Acute Pulmonary Embolism with Pulmonary Infarct  - Appears to be unprovoked at this time  - Lovenox BID transitioned to Eliquis  - ECHO EF 60-65%,   - Pain mananagement  - IVF  - Hypercoagulable work up ordered/ fu with Hematology     Acute DVT RLE   - Eliquis     Elevated Hemoglobin, concern for Polycythemia  - Hematology fu  - f/u EPO and Gabino-2 Mutation serology      Elevated BP without Dx of Hypertension  resolved  - possibly stress and pain induced  - monitor  - may have to treat if continues to be elevated  - will monitor for now     Disposition: home with outpatient fu          PENDING TEST RESULTS:   At the time of discharge the following test results are still pending: na    FOLLOW UP APPOINTMENTS:    Follow-up Information     Follow up With Details Comments Contact Info    Phys Other, MD  please obtain PCP fu prior to discharge/ new to area Patient can only remember the practice name and not the physician           ADDITIONAL CARE RECOMMENDATIONS:   1.I have prescribed you Eliquis, which is a blood thinner for your leg DVT and your PE. 2. You will need to follow the instructions in the dosepak for the first month and then start the second and third month. Discuss length of treatment with the hematologist.  3. You will need to follow up with hematology for follow up of your lab work up. 4. You are at risk for bleeding on a anticoagulant medication. Avoid trauma or high risk behaviors while on this medicine  5. Pain medication has been ordered    DIET: resume     ACTIVITY: resume    WOUND CARE: na    EQUIPMENT needed: prescriptions and Eliquis coupon book          DISCHARGE MEDICATIONS:  Current Discharge Medication List      START taking these medications    Details   apixaban (ELIQUIS) 5 mg (74 tabs) starter pack Take 10 mg (two 5 mg tablets) by mouth twice a day for 7 days   Followed by 5 mg (one 5 mg tablet) by mouth twice a day  Qty: 1 Dose Pack, Refills: 0      apixaban (ELIQUIS) 5 mg tablet Take 1 Tab by mouth two (2) times a day. Begin this prescription once you have completed your original dosepak.  Discuss the need for additional treatment with Heamtology  Qty: 60 Tab, Refills: 1      oxyCODONE IR (ROXICODONE) 10 mg tab immediate release tablet Take 1 Tab by mouth every four (4) hours as needed. Max Daily Amount: 60 mg.  Qty: 30 Tab, Refills: 0    Associated Diagnoses: Other acute pulmonary embolism without acute cor pulmonale (HCC)         STOP taking these medications       acetaminophen (TYLENOL) 500 mg tablet Comments:   Reason for Stopping:                 NOTIFY YOUR PHYSICIAN FOR ANY OF THE FOLLOWING:   Fever over 101 degrees for 24 hours. Chest pain, shortness of breath, fever, chills, nausea, vomiting, diarrhea, change in mentation, falling, weakness, bleeding. Severe pain or pain not relieved by medications. Or, any other signs or symptoms that you may have questions about. DISPOSITION:    Home With:   OT  PT  HH  RN       Long term SNF/Inpatient Rehab   x Independent/assisted living    Hospice    Other:       PATIENT CONDITION AT DISCHARGE:     Functional status    Poor     Deconditioned    x Independent      Cognition    x Lucid     Forgetful     Dementia      Catheters/lines (plus indication)    Hsu     PICC     PEG    x None      Code status    x Full code     DNR      PHYSICAL EXAMINATION AT DISCHARGE:  General:  Alert, cooperative, no distress   Head:  Normocephalic, without obvious abnormality, atraumatic       E/N/M/T: Nares normal. Septum midline.  No nasal drainage or sinus tenderness  Lips, mucosa, and tongue normal   Teeth and gums normal  Clear oropharynx   Neck: No carotid bruit   Normal JVP   Lungs:   Symmetrical chest expansion and respiratory effort  Clear to auscultation bilaterally   Chest wall:  No tenderness to palpation    Heart:  Regular rhythm   Sounds normal; no murmur, click, rub or gallop   Abdomen:   Soft, no tenderness  Bowel sounds normal       Extremities: + rt calf swelling,tender behind knee, +blayne's sign on Rt   Pulses 2+ and symmetric all extremities   Skin: No rashes or ulcers   Musculo-      skeletal: Gait not tested  Normal symmetry, ROM, strength and tone   Neuro: Normal cranial nerves  Normal reflexes and sensation Psych: Alert, oriented x3  Normal affect, judgement and insight   Geniturinary: deferred      Visit Vitals    /88 (BP 1 Location: Right arm, BP Patient Position: At rest)    Pulse 72    Temp 98.1 °F (36.7 °C)    Resp 18    Wt 118.8 kg (262 lb)    SpO2 96%    BMI 31.89 kg/m2           CHRONIC MEDICAL DIAGNOSES:  Problem List as of 4/18/2018  Date Reviewed: 4/18/2018          Codes Class Noted - Resolved    Pulmonary infarct [facility]) ICD-10-CM: I26.99  ICD-9-CM: 415.19  4/17/2018 - Present        Acute pulmonary embolism ([facility] [address] ICD-10-CM: I26.99  ICD-9-CM: 415.19  4/17/2018 - Present        Elevated hemoglobin (HCC) ICD-10-CM: D58.2  ICD-9-CM: 282.7  4/17/2018 - Present        Acute deep vein thrombosis of distal leg, right (HCC) ICD-10-CM: I82.4Z1  ICD-9-CM: 453.42  4/17/2018 - Present              Greater than 30 minutes were spent with the patient on counseling and coordination of care    Signed:   Cristina Faye NP  4/18/2018  10:54 AM

## 2018-04-18 NOTE — PROGRESS NOTES
New pt appointment on Monday April 30,2018 @ 2:15 p.m. Financial Screening will be @ 1:55 p.m.  Added to  AVS.    Rangel Shaver CM Specialist

## 2018-04-18 NOTE — PROGRESS NOTES
[address] April 18, 2018       RE: Yessenia Worrell      To Whom It May Concern,    This is to certify that Yessenia Worrell may return to work this Monday. He has been hospitalized here at [address] since 4-    Please feel free to contact my office if you have any questions or concerns. Thank you for your assistance in this matter.       Sincerely,  Rafi Ruiz NP  Lead NP  [facility]  [facility]  [phone]

## 2018-04-18 NOTE — ED NOTES
Paged hospitalist regarding pain meds. Patient given morphine at 1757. Ordered to modify morphine to 2 mg IV q3 hrs as needed.

## 2018-04-18 NOTE — ROUTINE PROCESS
TRANSFER - OUT REPORT:    Verbal report given to Nabila RN(name) on Emmit Offer  being transferred to 6E(unit) for routine progression of care       Report consisted of patients Situation, Background, Assessment and   Recommendations(SBAR). Information from the following report(s) SBAR, ED Summary, Intake/Output, MAR and Recent Results was reviewed with the receiving nurse. Lines:   Peripheral IV 04/17/18 Left Antecubital (Active)        Opportunity for questions and clarification was provided.       Patient transported with:   O2 @ 2 liters

## 2018-04-19 LAB
AT III PPP CHRO-ACNC: 96 % (ref 75–135)
EPO SERPL-ACNC: 4.3 MIU/ML (ref 2.6–18.5)

## 2018-04-20 ENCOUNTER — TELEPHONE (OUTPATIENT)
Dept: ONCOLOGY | Age: 48
End: 2018-04-20

## 2018-04-20 ENCOUNTER — HOSPITAL ENCOUNTER (EMERGENCY)
Age: 48
Discharge: HOME | End: 2018-04-20
Attending: EMERGENCY MEDICINE
Payer: SELF-PAY

## 2018-04-20 ENCOUNTER — APPOINTMENT (OUTPATIENT)
Dept: CT IMAGING | Age: 48
End: 2018-04-20
Attending: PHYSICIAN ASSISTANT
Payer: SELF-PAY

## 2018-04-20 VITALS
HEIGHT: 76 IN | OXYGEN SATURATION: 96 % | WEIGHT: 263.38 LBS | HEART RATE: 82 BPM | SYSTOLIC BLOOD PRESSURE: 132 MMHG | BODY MASS INDEX: 32.07 KG/M2 | RESPIRATION RATE: 15 BRPM | DIASTOLIC BLOOD PRESSURE: 88 MMHG | TEMPERATURE: 97.6 F

## 2018-04-20 DIAGNOSIS — I26.99 OTHER PULMONARY EMBOLISM WITHOUT ACUTE COR PULMONALE, UNSPECIFIED CHRONICITY (HCC): Primary | ICD-10-CM

## 2018-04-20 LAB
ALBUMIN SERPL-MCNC: 3.2 G/DL (ref 3.5–5)
ALBUMIN/GLOB SERPL: 0.7 {RATIO} (ref 1.1–2.2)
ALP SERPL-CCNC: 134 U/L (ref 45–117)
ALT SERPL-CCNC: 75 U/L (ref 12–78)
ANION GAP SERPL CALC-SCNC: 7 MMOL/L (ref 5–15)
AST SERPL-CCNC: 74 U/L (ref 15–37)
B2 GLYCOPROT1 IGA SER-ACNC: <9 GPI IGA UNITS (ref 0–25)
B2 GLYCOPROT1 IGA SER-ACNC: <9 GPI IGA UNITS (ref 0–25)
B2 GLYCOPROT1 IGG SER-ACNC: <9 GPI IGG UNITS (ref 0–20)
B2 GLYCOPROT1 IGM SER-ACNC: <9 GPI IGM UNITS (ref 0–32)
BASOPHILS # BLD: 0.1 K/UL (ref 0–0.1)
BASOPHILS NFR BLD: 1 % (ref 0–1)
BILIRUB SERPL-MCNC: 1.2 MG/DL (ref 0.2–1)
BUN SERPL-MCNC: 8 MG/DL (ref 6–20)
BUN/CREAT SERPL: 10 (ref 12–20)
CALCIUM SERPL-MCNC: 9.3 MG/DL (ref 8.5–10.1)
CHLORIDE SERPL-SCNC: 101 MMOL/L (ref 97–108)
CO2 SERPL-SCNC: 29 MMOL/L (ref 21–32)
CREAT SERPL-MCNC: 0.79 MG/DL (ref 0.7–1.3)
DIFFERENTIAL METHOD BLD: ABNORMAL
DRVVT MIX, 117894: 40.3 SEC (ref 0–47)
EOSINOPHIL # BLD: 0.2 K/UL (ref 0–0.4)
EOSINOPHIL NFR BLD: 2 % (ref 0–7)
ERYTHROCYTE [DISTWIDTH] IN BLOOD BY AUTOMATED COUNT: 14.6 % (ref 11.5–14.5)
GLOBULIN SER CALC-MCNC: 4.4 G/DL (ref 2–4)
GLUCOSE SERPL-MCNC: 88 MG/DL (ref 65–100)
HCT VFR BLD AUTO: 53.1 % (ref 36.6–50.3)
HGB BLD-MCNC: 17 G/DL (ref 12.1–17)
IMM GRANULOCYTES # BLD: 0.1 K/UL (ref 0–0.04)
IMM GRANULOCYTES NFR BLD AUTO: 1 % (ref 0–0.5)
INR PPP: 1.1 (ref 0.9–1.1)
INTERPRETATION, 117893: ABNORMAL
LYMPHOCYTES # BLD: 0.7 K/UL (ref 0.8–3.5)
LYMPHOCYTES NFR BLD: 9 % (ref 12–49)
MCH RBC QN AUTO: 29.6 PG (ref 26–34)
MCHC RBC AUTO-ENTMCNC: 32 G/DL (ref 30–36.5)
MCV RBC AUTO: 92.3 FL (ref 80–99)
MONOCYTES # BLD: 0.8 K/UL (ref 0–1)
MONOCYTES NFR BLD: 11 % (ref 5–13)
NEUTS SEG # BLD: 5.6 K/UL (ref 1.8–8)
NEUTS SEG NFR BLD: 76 % (ref 32–75)
NRBC # BLD: 0.02 K/UL (ref 0–0.01)
NRBC BLD-RTO: 0.3 PER 100 WBC
PLATELET # BLD AUTO: 204 K/UL (ref 150–400)
PMV BLD AUTO: 10.1 FL (ref 8.9–12.9)
POTASSIUM SERPL-SCNC: 4.5 MMOL/L (ref 3.5–5.1)
PROT SERPL-MCNC: 7.6 G/DL (ref 6.4–8.2)
PROTHROMBIN TIME: 11.1 SEC (ref 9–11.1)
RBC # BLD AUTO: 5.75 M/UL (ref 4.1–5.7)
RBC MORPH BLD: ABNORMAL
SCREEN APTT: 46.8 SEC (ref 0–51.9)
SCREEN DRVVT: 47.4 SEC (ref 0–47)
SODIUM SERPL-SCNC: 137 MMOL/L (ref 136–145)
TROPONIN I SERPL-MCNC: <0.04 NG/ML
WBC # BLD AUTO: 7.5 K/UL (ref 4.1–11.1)

## 2018-04-20 PROCEDURE — 71275 CT ANGIOGRAPHY CHEST: CPT

## 2018-04-20 PROCEDURE — 93005 ELECTROCARDIOGRAM TRACING: CPT

## 2018-04-20 PROCEDURE — 85025 COMPLETE CBC W/AUTO DIFF WBC: CPT | Performed by: PHYSICIAN ASSISTANT

## 2018-04-20 PROCEDURE — 85610 PROTHROMBIN TIME: CPT | Performed by: PHYSICIAN ASSISTANT

## 2018-04-20 PROCEDURE — 84484 ASSAY OF TROPONIN QUANT: CPT | Performed by: PHYSICIAN ASSISTANT

## 2018-04-20 PROCEDURE — 80053 COMPREHEN METABOLIC PANEL: CPT | Performed by: PHYSICIAN ASSISTANT

## 2018-04-20 PROCEDURE — 99282 EMERGENCY DEPT VISIT SF MDM: CPT

## 2018-04-20 PROCEDURE — 36415 COLL VENOUS BLD VENIPUNCTURE: CPT | Performed by: PHYSICIAN ASSISTANT

## 2018-04-20 PROCEDURE — 74011636320 HC RX REV CODE- 636/320: Performed by: EMERGENCY MEDICINE

## 2018-04-20 PROCEDURE — 74011000258 HC RX REV CODE- 258: Performed by: EMERGENCY MEDICINE

## 2018-04-20 RX ORDER — SODIUM CHLORIDE 0.9 % (FLUSH) 0.9 %
10 SYRINGE (ML) INJECTION
Status: COMPLETED | OUTPATIENT
Start: 2018-04-20 | End: 2018-04-20

## 2018-04-20 RX ORDER — SODIUM CHLORIDE 0.9 % (FLUSH) 0.9 %
10 SYRINGE (ML) INJECTION
Status: CANCELLED | OUTPATIENT
Start: 2018-04-20 | End: 2018-04-20

## 2018-04-20 RX ADMIN — SODIUM CHLORIDE 100 ML: 900 INJECTION, SOLUTION INTRAVENOUS at 15:45

## 2018-04-20 RX ADMIN — Medication 10 ML: at 15:45

## 2018-04-20 RX ADMIN — IOPAMIDOL 100 ML: 755 INJECTION, SOLUTION INTRAVENOUS at 15:45

## 2018-04-20 NOTE — ED TRIAGE NOTES
Pt was recently admitted for a clot in his right leg and two in the right lung. Pt began to spit up blood yesterday afternoon and today a little more. Pt states that he has pain in his right calf and it is still painful to breath in. Pt is on Eliquis 5mg.

## 2018-04-20 NOTE — ED PROVIDER NOTES
HPI Comments: 50 y.o. male with past medical history significant for ureteral stones, PE, and anxiety presents with complaints of hemoptysis. The pt was admitted on 4/17/18 d/t PE. He was discharged home oin eliquis. He states that yesterday \"I spit up some blood and more again today. \"  Pt still complains of pain in his right calf which is unchanged. There are no other acute medical complaints at this time. PCP: MD Virgilio Balderas PA-C    Patient is a 50 y.o. male presenting with hemoptysis. Hemoptysis    Pertinent negatives include no fever, no abdominal pain, no diarrhea, no arthralgias, no myalgias and no cough. Past Medical History:   Diagnosis Date    Anxiety     Kidney stone     Pulmonary embolism ([address]        Past Surgical History:   Procedure Laterality Date    HX TONSILLECTOMY           History reviewed. No pertinent family history. Social History     Social History    Marital status: SINGLE     Spouse name: N/A    Number of children: N/A    Years of education: N/A     Occupational History    Not on file. Social History Main Topics    Smoking status: Never Smoker    Smokeless tobacco: Never Used    Alcohol use Yes    Drug use: No    Sexual activity: Not on file     Other Topics Concern    Not on file     Social History Narrative         ALLERGIES: Review of patient's allergies indicates no known allergies. Review of Systems   Constitutional: Negative for activity change, appetite change, diaphoresis and fever. HENT: Negative for ear discharge, ear pain, facial swelling, rhinorrhea, sore throat, tinnitus, trouble swallowing and voice change. Eyes: Negative for photophobia, pain, discharge, redness and visual disturbance. Respiratory: Positive for hemoptysis. Negative for cough, chest tightness, shortness of breath, wheezing and stridor. Cardiovascular: Negative for chest pain and palpitations.    Gastrointestinal: Negative for abdominal pain, constipation, diarrhea, nausea and vomiting. Endocrine: Negative for polydipsia and polyuria. Genitourinary: Negative for dysuria, flank pain and hematuria. Musculoskeletal: Negative for arthralgias, back pain and myalgias. Skin: Negative for color change and rash. Neurological: Negative for dizziness, syncope, speech difficulty, light-headedness and numbness. Psychiatric/Behavioral: Negative for behavioral problems. Vitals:    04/20/18 1427 04/20/18 1715   BP: 153/89 132/88   Pulse: 84 82   Resp: 16 15   Temp: 97.8 °F (36.6 °C) 97.6 °F (36.4 °C)   SpO2: 95% 96%   Weight: 119.5 kg (263 lb 6 oz)    Height: 6' 4\" (1.93 m)             Physical Exam   Constitutional: He is oriented to person, place, and time. He appears well-developed and well-nourished. No distress. HENT:   Head: Normocephalic and atraumatic. Eyes: Conjunctivae are normal. Pupils are equal, round, and reactive to light. Neck: Normal range of motion. Neck supple. Cardiovascular: Normal rate, regular rhythm and normal heart sounds. Pulmonary/Chest: Effort normal and breath sounds normal. No respiratory distress. He has no wheezes. Abdominal: Soft. Bowel sounds are normal. He exhibits no distension. There is no tenderness. Musculoskeletal: Normal range of motion. Neurological: He is alert and oriented to person, place, and time. Skin: Skin is warm. He is not diaphoretic. MDM  Number of Diagnoses or Management Options  Other pulmonary embolism without acute cor pulmonale, unspecified chronicity [facility]):   Diagnosis management comments: CTA revealed PE which is unchanged. Will advise that pt continue taking eliquis and follow up with family doctor for further evaluation of symptoms. Reviewed treatment plan with attending and they agree.   Ras Moore PA-C        ED Course       Procedures

## 2018-04-20 NOTE — TELEPHONE ENCOUNTER
Pt was in Hospital on Monday and Tuesday for blood clots, pt is having symptoms of periodically spitting up blood. Pt is on a blood thinner, and he has been having nausea. First available appointment is 5/23/2018 and pt wants something much sooner.  Please inform where pt can be scheduled and time

## 2018-04-20 NOTE — TELEPHONE ENCOUNTER
Call from patient. Complaints of periodic nausea. He is drinking plenty of fluids. Taking his blood thinners as prescribed. Took 1 oxycodone at 6 am this morning. Yesterday and today he has been splitting up clots. Sometimes the size of a quarter, sometimes a dime and sometimes just specs. It happened about 3 times yesterday and 5-6 times today. He has been and is lightheaded. He is now in the parking lot, he felt like he needed to  his car. Questioned safety in driving. After discussing with Dr. Slime Tam, proceed to the ED. Pt verbalized understanding and stated he could safely proceed.

## 2018-04-21 LAB
ATRIAL RATE: 78 BPM
CALCULATED P AXIS, ECG09: 36 DEGREES
CALCULATED R AXIS, ECG10: 22 DEGREES
CALCULATED T AXIS, ECG11: 31 DEGREES
CARDIOLIPIN IGA SER IA-ACNC: <9 APL U/ML (ref 0–11)
CARDIOLIPIN IGG SER IA-ACNC: <9 GPL U/ML (ref 0–14)
CARDIOLIPIN IGM SER IA-ACNC: <9 MPL U/ML (ref 0–12)
DIAGNOSIS, 93000: NORMAL
P-R INTERVAL, ECG05: 156 MS
Q-T INTERVAL, ECG07: 372 MS
QRS DURATION, ECG06: 90 MS
QTC CALCULATION (BEZET), ECG08: 424 MS
VENTRICULAR RATE, ECG03: 78 BPM

## 2018-04-23 LAB
F2 GENE MUT ANL BLD/T: NORMAL
F5 GENE MUT ANL BLD/T: NORMAL

## 2018-04-24 LAB
BACKGROUND: 489207: NORMAL
DIRECTOR REVIEW: 489204: NORMAL
JAK2 P.V617F BLD/T QL: NORMAL

## 2018-04-27 ENCOUNTER — TELEPHONE (OUTPATIENT)
Dept: ONCOLOGY | Age: 48
End: 2018-04-27

## 2018-04-27 ENCOUNTER — HOSPITAL ENCOUNTER (EMERGENCY)
Age: 48
Discharge: HOME | End: 2018-04-27
Attending: EMERGENCY MEDICINE
Payer: SELF-PAY

## 2018-04-27 ENCOUNTER — APPOINTMENT (OUTPATIENT)
Dept: GENERAL RADIOLOGY | Age: 48
End: 2018-04-27
Attending: EMERGENCY MEDICINE
Payer: SELF-PAY

## 2018-04-27 VITALS
SYSTOLIC BLOOD PRESSURE: 141 MMHG | TEMPERATURE: 97.2 F | BODY MASS INDEX: 32.15 KG/M2 | WEIGHT: 264 LBS | HEART RATE: 71 BPM | HEIGHT: 76 IN | RESPIRATION RATE: 11 BRPM | DIASTOLIC BLOOD PRESSURE: 84 MMHG | OXYGEN SATURATION: 95 %

## 2018-04-27 DIAGNOSIS — I26.99 PE (PULMONARY THROMBOEMBOLISM) (HCC): Primary | ICD-10-CM

## 2018-04-27 DIAGNOSIS — I82.4Z1 ACUTE DEEP VEIN THROMBOSIS OF DISTAL LEG, RIGHT (HCC): ICD-10-CM

## 2018-04-27 DIAGNOSIS — R04.2 HEMOPTYSIS: ICD-10-CM

## 2018-04-27 DIAGNOSIS — I26.99 OTHER ACUTE PULMONARY EMBOLISM WITHOUT ACUTE COR PULMONALE (HCC): ICD-10-CM

## 2018-04-27 LAB
ALBUMIN SERPL-MCNC: 3.5 G/DL (ref 3.5–5)
ALBUMIN/GLOB SERPL: 1 {RATIO} (ref 1.1–2.2)
ALP SERPL-CCNC: 84 U/L (ref 45–117)
ALT SERPL-CCNC: 38 U/L (ref 12–78)
ANION GAP SERPL CALC-SCNC: 8 MMOL/L (ref 5–15)
AST SERPL-CCNC: 19 U/L (ref 15–37)
BASOPHILS # BLD: 0.1 K/UL (ref 0–0.1)
BASOPHILS NFR BLD: 1 % (ref 0–1)
BILIRUB SERPL-MCNC: 0.7 MG/DL (ref 0.2–1)
BUN SERPL-MCNC: 14 MG/DL (ref 6–20)
BUN/CREAT SERPL: 17 (ref 12–20)
CALCIUM SERPL-MCNC: 8.9 MG/DL (ref 8.5–10.1)
CHLORIDE SERPL-SCNC: 103 MMOL/L (ref 97–108)
CO2 SERPL-SCNC: 27 MMOL/L (ref 21–32)
CREAT SERPL-MCNC: 0.81 MG/DL (ref 0.7–1.3)
DIFFERENTIAL METHOD BLD: ABNORMAL
EOSINOPHIL # BLD: 0.2 K/UL (ref 0–0.4)
EOSINOPHIL NFR BLD: 3 % (ref 0–7)
ERYTHROCYTE [DISTWIDTH] IN BLOOD BY AUTOMATED COUNT: 13.5 % (ref 11.5–14.5)
GLOBULIN SER CALC-MCNC: 3.6 G/DL (ref 2–4)
GLUCOSE SERPL-MCNC: 87 MG/DL (ref 65–100)
HCT VFR BLD AUTO: 52.1 % (ref 36.6–50.3)
HGB BLD-MCNC: 16.6 G/DL (ref 12.1–17)
IMM GRANULOCYTES # BLD: 0.1 K/UL (ref 0–0.04)
IMM GRANULOCYTES NFR BLD AUTO: 2 % (ref 0–0.5)
INR PPP: 1.1 (ref 0.9–1.1)
LYMPHOCYTES # BLD: 1.2 K/UL (ref 0.8–3.5)
LYMPHOCYTES NFR BLD: 16 % (ref 12–49)
MCH RBC QN AUTO: 29.4 PG (ref 26–34)
MCHC RBC AUTO-ENTMCNC: 31.9 G/DL (ref 30–36.5)
MCV RBC AUTO: 92.2 FL (ref 80–99)
MONOCYTES # BLD: 0.7 K/UL (ref 0–1)
MONOCYTES NFR BLD: 10 % (ref 5–13)
NEUTS SEG # BLD: 5.2 K/UL (ref 1.8–8)
NEUTS SEG NFR BLD: 69 % (ref 32–75)
NRBC # BLD: 0 K/UL (ref 0–0.01)
NRBC BLD-RTO: 0 PER 100 WBC
PLATELET # BLD AUTO: 276 K/UL (ref 150–400)
PMV BLD AUTO: 9.8 FL (ref 8.9–12.9)
POTASSIUM SERPL-SCNC: 4 MMOL/L (ref 3.5–5.1)
PROT SERPL-MCNC: 7.1 G/DL (ref 6.4–8.2)
PROTHROMBIN TIME: 11.2 SEC (ref 9–11.1)
RBC # BLD AUTO: 5.65 M/UL (ref 4.1–5.7)
SODIUM SERPL-SCNC: 138 MMOL/L (ref 136–145)
TROPONIN I SERPL-MCNC: <0.04 NG/ML
WBC # BLD AUTO: 7.5 K/UL (ref 4.1–11.1)

## 2018-04-27 PROCEDURE — 93005 ELECTROCARDIOGRAM TRACING: CPT

## 2018-04-27 PROCEDURE — 93971 EXTREMITY STUDY: CPT

## 2018-04-27 PROCEDURE — 80053 COMPREHEN METABOLIC PANEL: CPT | Performed by: NURSE PRACTITIONER

## 2018-04-27 PROCEDURE — 84484 ASSAY OF TROPONIN QUANT: CPT | Performed by: NURSE PRACTITIONER

## 2018-04-27 PROCEDURE — 71046 X-RAY EXAM CHEST 2 VIEWS: CPT

## 2018-04-27 PROCEDURE — 99284 EMERGENCY DEPT VISIT MOD MDM: CPT

## 2018-04-27 PROCEDURE — 85610 PROTHROMBIN TIME: CPT | Performed by: NURSE PRACTITIONER

## 2018-04-27 PROCEDURE — 85025 COMPLETE CBC W/AUTO DIFF WBC: CPT | Performed by: NURSE PRACTITIONER

## 2018-04-27 PROCEDURE — 36415 COLL VENOUS BLD VENIPUNCTURE: CPT | Performed by: NURSE PRACTITIONER

## 2018-04-27 RX ORDER — OXYCODONE HYDROCHLORIDE 10 MG/1
10 TABLET ORAL
Qty: 30 TAB | Refills: 0 | Status: SHIPPED | OUTPATIENT
Start: 2018-04-27

## 2018-04-27 NOTE — ED NOTES
Pt laying comfortably on stretcher. Side rails up x1. Pt denies any needs/questions at this time. VSS.

## 2018-04-27 NOTE — ED NOTES
10:50 AM  I have just evaluated the patient. I have reviewed His vital signs and determined there is currently no worsening in their condition or physical exam. I have talked with the patient and the family and advised them that I am the provider in triage and have ordered lab work, x rays and other diagnostic tests. I have advised them that we will try and get them to the back as soon as possible. I have also advised them that should they have a worsening condition or any problems before they are sent back to the main ED, to contact me or the triage nurse.       Sabrina Hudson, SABAS

## 2018-04-27 NOTE — ED TRIAGE NOTES
Recent admitted for PE and DVT. Pt has pain in right side chest that started last night. Pt reports coughing up blood. Pt taking eliquis.

## 2018-04-27 NOTE — ED PROVIDER NOTES
HPI Comments: 50 y.o. male with past medical history significant for Pulmonary Embolism, Anxiety, and Kidney Stones who presents from Home with chief complaint of Chest Pain. Patient was recently admitted to [facility] ED on 4/17 to 4/18 for an Acute Pulmonary Embolism with Pulmonary Infarct and DVT. Patient's ECHO EF was 60-65%. During admission, patient was treated with Lovenox BID transitioned to Eliquis at the time of discharge. Patient was seen again at [facility] ED on 4/20/18, for hemoptysis, during which time patient completed a CTA which showed no significant change and patient was discharged Home. Patient states continued hemoptysis \"over the past several weeks\". Patient states onset last night around \"1900\" of right sided chest pain with radiation to the right side of his back. Patient reports he took Hydrocodone around 0200 with relief of chest pain, around 0300. Pt reports constant slight right sided chest pain. Pt states accompanying constant nausea, dyspnea on exertion, lightheadedness, and increased fatigue. Pt denies fever, chills, cough, congestion, abdominal pain, vomiting, diarrhea, difficulty with urination or dysuria. There are no other acute medical concerns at this time. Note written by Lamont Kaba, as dictated by Roddy Wilson DO 11:46 AM    The history is provided by the patient. Past Medical History:   Diagnosis Date    Anxiety     Kidney stone     Pulmonary embolism ([address]        Past Surgical History:   Procedure Laterality Date    HX TONSILLECTOMY           History reviewed. No pertinent family history. Social History     Social History    Marital status: SINGLE     Spouse name: N/A    Number of children: N/A    Years of education: N/A     Occupational History    Not on file.      Social History Main Topics    Smoking status: Never Smoker    Smokeless tobacco: Never Used    Alcohol use Yes    Drug use: No    Sexual activity: Not on file     Other Topics Concern    Not on file     Social History Narrative         ALLERGIES: Review of patient's allergies indicates no known allergies. Review of Systems   Constitutional: Positive for fatigue. Negative for chills and fever. HENT: Negative for congestion. Respiratory: Positive for shortness of breath. Negative for cough. Cardiovascular: Positive for chest pain. Gastrointestinal: Positive for nausea. Negative for abdominal pain, diarrhea and vomiting. Genitourinary: Negative for difficulty urinating and dysuria. Neurological: Positive for light-headedness. All other systems reviewed and are negative. Vitals:    04/27/18 1051 04/27/18 1101 04/27/18 1200   BP: (!) 142/97 146/87 139/79   Pulse: 70 72 74   Resp: 18 18 14   Temp: 97.2 °F (36.2 °C)     SpO2: 97% 97% 94%   Weight: 119.7 kg (264 lb)     Height: 6' 4\" (1.93 m)              Physical Exam   Constitutional: He is oriented to person, place, and time. He appears well-developed and well-nourished. HENT:   Head: Normocephalic and atraumatic. Eyes: Conjunctivae are normal.   Neck: Normal range of motion. Cardiovascular: Normal rate and intact distal pulses. Pulmonary/Chest: Effort normal and breath sounds normal. No respiratory distress. Abdominal: Soft. Bowel sounds are normal. There is no tenderness. There is no rebound and no guarding. Musculoskeletal: Normal range of motion. Neurological: He is alert and oriented to person, place, and time. Skin: Skin is warm and dry. Psychiatric: His behavior is normal.   Nursing note and vitals reviewed. Note written by Alayne Shone, Scribe, as dictated by Priscilla Leonard DO 11:49 AM    [facility]      ED Course       Procedures      ED EKG interpretation:  Rhythm: normal sinus rhythm; and regular . Rate (approx.): 64 BPM; Axis: normal; ST/T wave: normal; 4/17 AVF was negative, 4/20 AVF was positive, today AVF negative.  No obvious   Note written by Alayne Shone, Scribe, as dictated by María Elena Davalos DO 11:49 AM    CONSULT NOTE:  2:16 PM María Elena Davalos DO spoke with Saqib Gomes NP Consult for Hematology. Discussed available diagnostic tests and clinical findings. Recommends Doppler scan. If no progression, will have patient 5/1/18 follow up appointment in the office.

## 2018-04-27 NOTE — DISCHARGE INSTRUCTIONS
Coughing Up Blood: Care Instructions  Your Care Instructions    Coughing up blood can be frightening. The blood may come from the lungs, stomach, or throat. You may cough up a few thin streaks of bright red blood. This probably is not a cause for concern. Coughing up large amounts of bright red blood or rust-colored mucus from the lungs can be a symptom of a more serious condition. Several conditions can make you cough up blood from the lungs. These include bronchitis and pneumonia, or more serious problems such as cancer or a blood clot in the lung (pulmonary embolus). Depending on what is causing your cough, it may go away after the illness is treated. Your doctor may tell you not to suppress the cough with cough medicine if it is better for you to cough up the blood and spit it out. Follow-up care is a key part of your treatment and safety. Be sure to make and go to all appointments, and call your doctor if you are having problems. It's also a good idea to know your test results and keep a list of the medicines you take. How can you care for yourself at home? · Make a note of when and for how long you cough up blood. Also note if you are coughing up spit with a small amount of blood, or mostly blood. Take this information to your next appointment with your doctor. · Increase your fluid intake to at least 8 to 10 glasses of water every day. This helps keep the mucus thin and helps you cough it up. If you have kidney, heart, or liver disease and have to limit fluids, talk with your doctor before you increase your fluid intake. · If your doctor prescribed antibiotics, take them as directed. Do not stop taking them just because you feel better. You need to take the full course of antibiotics. · Do not take cough medicine without your doctor's guidance. They can cause problems if you have other health problems. They can also interact with other medicine.   · Do not smoke or use other forms of tobacco, especially while you have a cough. Smoking can make coughing worse. If you need help quitting, talk to your doctor about stop-smoking programs and medicines. These can increase your chances of quitting for good. · Avoid exposure to smoke, dust, or other pollutants. When should you call for help? Call 911 anytime you think you may need emergency care. For example, call if:  ? · You have sudden chest pain and shortness of breath. ? · You have severe trouble breathing. ?Call your doctor now or seek immediate medical care if:  ? · You have wheezing and difficulty breathing. ? · You are dizzy or lightheaded, or you feel like you may faint. ? · You cough up clots of blood. ? Watch closely for changes in your health, and be sure to contact your doctor if:  ? · You do not get better as expected. ? · You have any new symptoms, such as chest pain with difficulty breathing or a fever. Where can you learn more? Go to http://broderick-citlalli.info/. Enter M550 in the search box to learn more about \"Coughing Up Blood: Care Instructions. \"  Current as of: March 20, 2017  Content Version: 11.4  © 7764-2674 [employer]. Care instructions adapted under license by [employer] (which disclaims liability or warranty for this information). If you have questions about a medical condition or this instruction, always ask your healthcare professional. [place] [address] any warranty or liability for your use of this information. Pulmonary Embolism: Care Instructions  Your Care Instructions    Pulmonary embolism is the sudden blockage of an artery in the lung. Blood clots in the deep veins of the leg or pelvis (deep vein thrombosis, or DVT) are the most common cause. These blood clots can travel to the lungs. Pulmonary embolism can be very serious. Because you have had one pulmonary embolism, you are at greater risk for having another one.  But you can take steps to prevent another pulmonary embolism by following your doctor's instructions. You will probably take a prescription blood-thinning medicine to prevent blood clots. A blood thinner can stop a blood clot from growing larger and prevent new clots from forming. Follow-up care is a key part of your treatment and safety. Be sure to make and go to all appointments, and call your doctor if you are having problems. It's also a good idea to know your test results and keep a list of the medicines you take. How can you care for yourself at home? · Take your medicines exactly as prescribed. Call your doctor if you think you are having a problem with your medicine. You will get more details on the specific medicines your doctor prescribes. · If you are taking a blood thinner, be sure you get instructions about how to take your medicine safely. Blood thinners can cause serious bleeding problems. Preventing future pulmonary embolisms  · Exercise. Keep blood moving in your legs to keep clots from forming. If you are traveling by car, stop every hour or so. Get out and walk around for a few minutes. If you are traveling by bus, train, or plane, get out of your seat and walk up and down the aisles every hour or so. You also can do leg exercises while you are seated. Pump your feet up and down by pulling your toes up toward your knees then pointing them down. · Get up out of bed as soon as possible after an illness or surgery. · Do not smoke. If you need help quitting, talk to your doctor about stop-smoking programs and medicines. These can increase your chances of quitting for good. · Check with your doctor before taking hormone or birth control pills. These may increase your risk of blot clots. · Ask your doctor about wearing compression stockings to help prevent blood clots in your legs. There are different types of stockings, and they need to fit right. So your doctor will recommend what you need.   When should you call for help? Call 911 anytime you think you may need emergency care. For example, call if:  ? · You have shortness of breath. ? · You have chest pain. ? · You passed out (lost consciousness). ? · You cough up blood. ?Call your doctor now or seek immediate medical care if:  ? · You have new or worsening pain or swelling in your leg. ? Watch closely for changes in your health, and be sure to contact your doctor if:  ? · You do not get better as expected. Where can you learn more? Go to http://broderick-citlalli.info/. Enter X900 in the search box to learn more about \"Pulmonary Embolism: Care Instructions. \"  Current as of: March 20, 2017  Content Version: 11.4  © 1838-8486 [employer]. Care instructions adapted under license by [employer] (which disclaims liability or warranty for this information). If you have questions about a medical condition or this instruction, always ask your healthcare professional. [place] [address] any warranty or liability for your use of this information.

## 2018-04-27 NOTE — LETTER
NOTIFICATION RETURN TO WORK / SCHOOL 
 
4/27/2018 2:36 PM 
 
Mr. Linda Sanchez [address] [place] [address],[facility] [address] To Whom It May Concern: 
 
Linda Sanchez is currently under the care of [facility], [facility] and [address] [place] [address] Hematology Oncology. Please excuse Mr. Cai 4/23/18  through 4/27/18 He will return to work/school on: 5/2/2018 If there are questions or concerns please have the patient contact our office.  
 
 
 
Sincerely, 
 
 
 
 
 
 
 Krupa Anthony DO

## 2018-04-27 NOTE — CONSULTS
[facility] [place] at [address]  [address] [facility], [address], [address] Michele [address]rozco  W: [phone]  F: [phone]    Reason for Visit:   Talat Mendoza is a 50 y.o. male who is seen in consultation at the request of Dr. Ellie Adkins for evaluation of DVT/PE. Treatment History:   · CTA 4/17/18 with pulmonary emboli of right lung  · CT of abd/pelvis 4/17/18 with bilateral non-obstructing renal calculi, no other acute abnormality. · Doppler right leg 4/17/18 consistent with acute DVT right popliteal vein. · 4/20/18 CTA with stable appearance of PE    History of Present Illness:   Mr. Jerome Hawkins is 50 y.o. male with no significant past medical history who presented to ED on 4/17/18 with c/o one week history of right sided flank pain. Previous ED admission:     Found to have right leg DVT and right lung PE. Initially symptoms associated with nausea, low appetite. Has also noticed in early Feb right leg discomfort behind right knee with associated leg weakness. Denies shortness of breath, chest pain, dizziness or headache. No recent falls, injury, broken bones or trauma. No periods of prolonged immobilization. Does endorse pain in right side of chest with deep breathing. Travels for work frequently. Has flown back and forth from [facility] (4 hours total travel time) every 2 weeks from August to Feb. Around Feb 19th he drove back and forth from [place]. Noticed pain in right leg prior to [place] trip. Active with work, works full time. Walks 4-9 miles per day on the job. Has twin brother who is healthy. Sister, age 48, has pacemaker and cardiac illness thought to be related to heavy smoking. Has 3 children ages 7-14 who are all healthy. No personal history of blood clots, easily bruising or bleeding. Denies blood in urine or stool. No change in bowel habits or recent weight loss. No dark/black or blood in stools. History of tonsillectomy as a child without incident.      Drinks alcohol about 2-3 glasses of wine 2-3 days a week. Denies active smoking or illicit drug use. Denies use of testosterone replacement therapy. Current ED admission:    Right sided chest pain resolved about 3 days following initiation of Eliquis. Nausea continues. Activity intolerance. No SOB. Has noticed hemoptysis about nickel sized amount to quarter sized amount twice daily since initiation of Eliquis. Dark red blood. No other s/s of bleeding. No blood in stools. No dizziness, chest pain, headache or change in vision. Did have pain in right leg yesterday that preceded acute exacerbation of pain in right side of chest.       Past Medical History:   Diagnosis Date    Anxiety     Kidney stone     Pulmonary embolism (HCC)       Past Surgical History:   Procedure Laterality Date    HX TONSILLECTOMY        Social History   Substance Use Topics    Smoking status: Never Smoker    Smokeless tobacco: Never Used    Alcohol use Yes      History reviewed. No pertinent family history. No current facility-administered medications for this encounter. Current Outpatient Prescriptions   Medication Sig    apixaban (ELIQUIS) 5 mg (74 tabs) starter pack Take 10 mg (two 5 mg tablets) by mouth twice a day for 7 days   Followed by 5 mg (one 5 mg tablet) by mouth twice a day    apixaban (ELIQUIS) 5 mg tablet Take 1 Tab by mouth two (2) times a day. Begin this prescription once you have completed your original dosepak. Discuss the need for additional treatment with Heamtology    oxyCODONE IR (ROXICODONE) 10 mg tab immediate release tablet Take 1 Tab by mouth every four (4) hours as needed. Max Daily Amount: 60 mg. No Known Allergies     Review of Systems: A complete review of systems was obtained, negative except as described above.     Physical Exam:     Visit Vitals    /79    Pulse 74    Temp 97.2 °F (36.2 °C)    Resp 14    Ht 6' 4\" (1.93 m)    Wt 264 lb (119.7 kg)    SpO2 94%    BMI 32.14 kg/m2 ECOG PS: 0  General: No distress  Eyes: PERRLA, anicteric sclerae  HENT: Atraumatic, OP clear  Neck: Supple  Lymphatic: No cervical, supraclavicular adenopathy  Respiratory: CTAB, normal respiratory effort  CV: Normal rate, regular rhythm, no peripheral edema noted  GI: Soft, nontender, nondistended  MS: Digits without clubbing or cyanosis. Skin: No rashes, ecchymoses, or petechiae. Normal temperature, turgor, and texture. Psych: Alert, oriented, appropriate affect, normal judgment/insight    Results:     Lab Results   Component Value Date/Time    WBC 7.5 04/27/2018 11:05 AM    HGB 16.6 04/27/2018 11:05 AM    HCT 52.1 (H) 04/27/2018 11:05 AM    PLATELET 755 77/74/9718 11:05 AM    MCV 92.2 04/27/2018 11:05 AM    ABS. NEUTROPHILS 5.2 04/27/2018 11:05 AM     Lab Results   Component Value Date/Time    Sodium 138 04/27/2018 11:05 AM    Potassium 4.0 04/27/2018 11:05 AM    Chloride 103 04/27/2018 11:05 AM    CO2 27 04/27/2018 11:05 AM    Glucose 87 04/27/2018 11:05 AM    BUN 14 04/27/2018 11:05 AM    Creatinine 0.81 04/27/2018 11:05 AM    GFR est AA >60 04/27/2018 11:05 AM    GFR est non-AA >60 04/27/2018 11:05 AM    Calcium 8.9 04/27/2018 11:05 AM     Lab Results   Component Value Date/Time    Bilirubin, total 0.7 04/27/2018 11:05 AM    ALT (SGPT) 38 04/27/2018 11:05 AM    AST (SGOT) 19 04/27/2018 11:05 AM    Alk. phosphatase 84 04/27/2018 11:05 AM    Protein, total 7.1 04/27/2018 11:05 AM    Albumin 3.5 04/27/2018 11:05 AM    Globulin 3.6 04/27/2018 11:05 AM     Lab Results   Component Value Date/Time    Erythropoietin 4.3 04/17/2018 04:44 PM     Lab Results   Component Value Date/Time    INR 1.1 04/27/2018 11:05 AM    aPTT 27.6 04/17/2018 12:12 PM    Antithrombin Activity 96 04/17/2018 04:44 PM     JAK2 negative    APLA negative    Factor II, FVL, ATIII, All unremarkable    Prior CT CAP without evidence of malignancy    Records reviewed and summarized above.     Duplex 4/27/18    IMPRESSION:  Evidence of  deep vein thrombosis, as described above. In comparison to the previous exam on 2018, there is no evidence  of new thrombosis on today's exam    Assessment:   1) DVT/PE     Acute DVT of right leg and two PE of right lung with CTA done 18. This was considered unprovoked as the provoking factor (travel) was considered weak. His work up neither revealed a thrombophilia nor an underlying malignancy. Indefinite AC recommended as long as risk outweighs benefit    Has had persistent CP. Repeat CTA 18 with no acute change. Imaging reviewed personally. He also reports new R calf pain- repeat Dopplers today suggest stable thrombus. He may continue Eliquis at the current dose of 5 mg BID     2) Family history of sudden death  Family  unexpectedly and sudden at age of 47. Hypercoagulable work up unremarkable    3) Pleuritic pain with deep breathing and right calf pain. Due to PE and large infarct. Continue Oxycodone PRN. 4) Hemoptysis. Minor on anticoagulation  Likely explained by his rather central PE and large infarct, though an endobronchial lesion is not ruled out      Plan:     · Continue Eliquis 5 mg BID   · Oxycodone PRN, refilled  · Robitusson PRN outpatient  · RTC 1 week    I appreciate the opportunity to participate in Mr. Cranford Gilford care. Renee for discharge from hematology perspective.      Patient was seen with Jesenia Singh NP            Signed By: Eugene Parekh MD

## 2018-04-27 NOTE — ED NOTES
Report received from [facility]. No further questions/concerns at this time. Care assumed at this time.

## 2018-04-27 NOTE — PROCEDURES
[address]  *** FINAL REPORT ***    Name: Cody Gray  MRN: [id number]  : 1970  HIS Order #: [id number]  [address] [facility] Visit #: [id number]  Date: 2018    TYPE OF TEST: Peripheral Venous Testing    REASON FOR TEST  Pain in limb, Limb swelling, DVT 2018    Right Leg:-  Deep venous thrombosis:           Yes  Proximal extent of thrombus:      Popliteal Above The Knee  Superficial venous thrombosis:    No  Deep venous insufficiency:        Not examined  Superficial venous insufficiency: Not examined      INTERPRETATION/FINDINGS  PROCEDURE:  Color duplex ultrasound imaging of lower extremity veins. FINDINGS:       Right: Consistent with thrombosis involving the popliteal and  gastroc veins as demonstrated by vein non-compressibility, and by a  narrowing or occlusion of the flow channel on color Doppler imaging. The remaining segments; common femoral, deep femoral, femoral,  posterior tibial, peroneal, and great saphenous are patent and without   evidence of thrombus; each is is fully compressible and there is no  narrowing of the flow channel on color Doppler imaging. Phasic flow  is observed in the common femoral vein. Left:   The common femoral vein is patent and without evidence of   thrombus. Phasic flow is observed. This extremity was not otherwise   evaluated. IMPRESSION:  Evidence of  deep vein thrombosis, as described above. In comparison to the previous exam on 2018, there is no evidence  of new thrombosis on today's exam.    ADDITIONAL COMMENTS    I have personally reviewed the data relevant to the interpretation of  this  study. TECHNOLOGIST: Oskar Gomes RVT  Signed: 2018 03:40 PM    PHYSICIAN: Danny Fong.  Prem Patterson MD  Signed: 2018 10:22 AM

## 2018-04-27 NOTE — TELEPHONE ENCOUNTER
CONSULT:    Patient:  Owen Madrid    : 70    Referring Physician: Dr. Harper Don    Reason for Consult: PE on Eliquis;  Hemoptysis/pain    Room: ER 9

## 2018-04-28 LAB
ATRIAL RATE: 64 BPM
CALCULATED P AXIS, ECG09: 35 DEGREES
CALCULATED R AXIS, ECG10: -14 DEGREES
CALCULATED T AXIS, ECG11: 4 DEGREES
DIAGNOSIS, 93000: NORMAL
P-R INTERVAL, ECG05: 176 MS
Q-T INTERVAL, ECG07: 398 MS
QRS DURATION, ECG06: 94 MS
QTC CALCULATION (BEZET), ECG08: 410 MS
VENTRICULAR RATE, ECG03: 64 BPM

## 2018-04-30 ENCOUNTER — OFFICE VISIT (OUTPATIENT)
Dept: FAMILY MEDICINE CLINIC | Age: 48
End: 2018-04-30

## 2018-04-30 VITALS
WEIGHT: 266 LBS | DIASTOLIC BLOOD PRESSURE: 84 MMHG | SYSTOLIC BLOOD PRESSURE: 126 MMHG | HEART RATE: 72 BPM | BODY MASS INDEX: 32.38 KG/M2 | TEMPERATURE: 98.8 F

## 2018-04-30 DIAGNOSIS — I26.99 OTHER ACUTE PULMONARY EMBOLISM WITHOUT ACUTE COR PULMONALE (HCC): Primary | ICD-10-CM

## 2018-04-30 DIAGNOSIS — I26.99 PULMONARY INFARCT (HCC): ICD-10-CM

## 2018-04-30 DIAGNOSIS — I82.4Z1 ACUTE DEEP VEIN THROMBOSIS OF DISTAL LEG, RIGHT (HCC): ICD-10-CM

## 2018-04-30 NOTE — PATIENT INSTRUCTIONS
Pulmonary Embolism: Care Instructions  Your Care Instructions    Pulmonary embolism is the sudden blockage of an artery in the lung. Blood clots in the deep veins of the leg or pelvis (deep vein thrombosis, or DVT) are the most common cause. These blood clots can travel to the lungs. Pulmonary embolism can be very serious. Because you have had one pulmonary embolism, you are at greater risk for having another one. But you can take steps to prevent another pulmonary embolism by following your doctor's instructions. You will probably take a prescription blood-thinning medicine to prevent blood clots. A blood thinner can stop a blood clot from growing larger and prevent new clots from forming. Follow-up care is a key part of your treatment and safety. Be sure to make and go to all appointments, and call your doctor if you are having problems. It's also a good idea to know your test results and keep a list of the medicines you take. How can you care for yourself at home? · Take your medicines exactly as prescribed. Call your doctor if you think you are having a problem with your medicine. You will get more details on the specific medicines your doctor prescribes. · If you are taking a blood thinner, be sure you get instructions about how to take your medicine safely. Blood thinners can cause serious bleeding problems. Preventing future pulmonary embolisms  · Exercise. Keep blood moving in your legs to keep clots from forming. If you are traveling by car, stop every hour or so. Get out and walk around for a few minutes. If you are traveling by bus, train, or plane, get out of your seat and walk up and down the aisles every hour or so. You also can do leg exercises while you are seated. Pump your feet up and down by pulling your toes up toward your knees then pointing them down. · Get up out of bed as soon as possible after an illness or surgery. · Do not smoke.  If you need help quitting, talk to your doctor about stop-smoking programs and medicines. These can increase your chances of quitting for good. · Check with your doctor before taking hormone or birth control pills. These may increase your risk of blot clots. · Ask your doctor about wearing compression stockings to help prevent blood clots in your legs. There are different types of stockings, and they need to fit right. So your doctor will recommend what you need. When should you call for help? Call 911 anytime you think you may need emergency care. For example, call if:  ? · You have shortness of breath. ? · You have chest pain. ? · You passed out (lost consciousness). ? · You cough up blood. ?Call your doctor now or seek immediate medical care if:  ? · You have new or worsening pain or swelling in your leg. ? Watch closely for changes in your health, and be sure to contact your doctor if:  ? · You do not get better as expected. Where can you learn more? Go to http://broderick-citlalli.info/. Enter F126 in the search box to learn more about \"Pulmonary Embolism: Care Instructions. \"  Current as of: March 20, 2017  Content Version: 11.4  © 1286-7399 [employer]. Care instructions adapted under license by [employer] (which disclaims liability or warranty for this information). If you have questions about a medical condition or this instruction, always ask your healthcare professional. [address] any warranty or liability for your use of this information.

## 2018-04-30 NOTE — MR AVS SNAPSHOT
[address] [place] [address] [address] [id number] 
[phone] Patient: Satya Johnson MRN: [id number] FLL:7/7/2986 Visit Information Date & Time Provider Department Dept. Phone Encounter #  
 4/30/2018  2:15 PM Jose Daniel Silverman, [address] [place] [address] [phone] [id number] Follow-up Instructions Return in about 6 weeks (around 6/11/2018) for PE/DVT follow up appt. Your Appointments 5/1/2018  2:30 PM  
Follow Up with Darshan Haynes  [facility] at [facility] Appt Note: follow up; follow up [address] [facility] 209 [facility] 13  
[phone]  
  
   
 [address] [address] 5/8/2018  2:00 PM  
New Patient with Felipa Winter MD  
[facility] [address] [facility]) Appt Note: GRAY- Np Est. PCP f/up from [facility] [place] seen on 04/27/18 for Pain in lungs and vomiting blood. BDW 4/30/18  
 [facility] [address] [facility] 1401 [facility] 301 [facility] 83,[address]Th [address] [place] [address]  
  
    
 7/10/2018  9:00 AM  
New Patient with Mustapha Burgess MD  
[address] [facility] 3651 [facility]) Appt Note: Np referral from Hospital for DVT  
 [address] [facility] 209 [facility] 7 [address]  
[phone]  
  
   
 [address] [address] Upcoming Health Maintenance Date Due DTaP/Tdap/Td series (1 - Tdap) 1/7/1991 Influenza Age 5 to Adult 8/1/2018 Allergies as of 4/30/2018  Review Complete On: 4/30/2018 By: Jose Daniel Silverman MD  
 No Known Allergies Current Immunizations  Never Reviewed No immunizations on file. Not reviewed this visit Vitals BP Pulse Temp Weight(growth percentile) BMI Smoking Status 126/84 (BP 1 Location: Left arm, BP Patient Position: Sitting) 72 98.8 °F (37.1 °C) (Oral) 266 lb (120.7 kg) 32.38 kg/m2 Never Smoker Vitals History BMI and BSA Data Body Mass Index Body Surface Area  
 32.38 kg/m 2 2.54 m 2 Preferred Pharmacy Pharmacy Name Phone [facility] [address] [place] [address], [facility] [phone] Your Updated Medication List  
  
   
This list is accurate as of 4/30/18  3:34 PM.  Always use your most recent med list.  
  
  
  
  
 apixaban 5 mg tablet Commonly known as:  Belgica Rojas Take 1 Tab by mouth two (2) times a day. Via PAP  
  
 oxyCODONE IR 10 mg Tab immediate release tablet Commonly known as:  Sandor Walkerritt Take 1 Tab by mouth every four (4) hours as needed. Max Daily Amount: 60 mg.  
  
  
  
  
Prescriptions Printed Refills  
 apixaban (ELIQUIS) 5 mg tablet 1 Sig: Take 1 Tab by mouth two (2) times a day. Via PAP Class: Program  
 Route: Oral  
  
Prescriptions Sent to Mail Order Refills  
 apixaban (ELIQUIS) 5 mg tablet 1 Sig: Take 1 Tab by mouth two (2) times a day. Via PAP Class: Program  
 Pharmacy: [facility] [address] [place] [address], [address] [place] [address] Ph #: [phone] Route: Oral  
  
Prescriptions Sent to Pharmacy Refills  
 apixaban (ELIQUIS) 5 mg tablet 1 Sig: Take 1 Tab by mouth two (2) times a day. Via PAP Class: Program  
 Pharmacy: [facility] [address] [place] [address], [address] [place] [address] Ph #: [phone] Route: Oral  
  
Follow-up Instructions Return in about 6 weeks (around 6/11/2018) for PE/DVT follow up appt. Patient Instructions Pulmonary Embolism: Care Instructions Your Care Instructions Pulmonary embolism is the sudden blockage of an artery in the lung. Blood clots in the deep veins of the leg or pelvis (deep vein thrombosis, or DVT) are the most common cause. These blood clots can travel to the lungs. Pulmonary embolism can be very serious. Because you have had one pulmonary embolism, you are at greater risk for having another one. But you can take steps to prevent another pulmonary embolism by following your doctor's instructions. You will probably take a prescription blood-thinning medicine to prevent blood clots. A blood thinner can stop a blood clot from growing larger and prevent new clots from forming. Follow-up care is a key part of your treatment and safety. Be sure to make and go to all appointments, and call your doctor if you are having problems. It's also a good idea to know your test results and keep a list of the medicines you take. How can you care for yourself at home? · Take your medicines exactly as prescribed. Call your doctor if you think you are having a problem with your medicine. You will get more details on the specific medicines your doctor prescribes. · If you are taking a blood thinner, be sure you get instructions about how to take your medicine safely. Blood thinners can cause serious bleeding problems. Preventing future pulmonary embolisms · Exercise. Keep blood moving in your legs to keep clots from forming. If you are traveling by car, stop every hour or so. Get out and walk around for a few minutes. If you are traveling by bus, train, or plane, get out of your seat and walk up and down the aisles every hour or so. You also can do leg exercises while you are seated. Pump your feet up and down by pulling your toes up toward your knees then pointing them down. · Get up out of bed as soon as possible after an illness or surgery. · Do not smoke. If you need help quitting, talk to your doctor about stop-smoking programs and medicines. These can increase your chances of quitting for good. · Check with your doctor before taking hormone or birth control pills. These may increase your risk of blot clots.  
· Ask your doctor about wearing compression stockings to help prevent blood clots in your legs. There are different types of stockings, and they need to fit right. So your doctor will recommend what you need. When should you call for help? Call 911 anytime you think you may need emergency care. For example, call if: 
? · You have shortness of breath. ? · You have chest pain. ? · You passed out (lost consciousness). ? · You cough up blood. ?Call your doctor now or seek immediate medical care if: 
? · You have new or worsening pain or swelling in your leg. ? Watch closely for changes in your health, and be sure to contact your doctor if: 
? · You do not get better as expected. Where can you learn more? Go to http://broderick-citlalli.info/. Enter Z907 in the search box to learn more about \"Pulmonary Embolism: Care Instructions. \" Current as of: March 20, 2017 Content Version: 11.4 © 9929-3513 [employer]. Care instructions adapted under license by [employer] (which disclaims liability or warranty for this information). If you have questions about a medical condition or this instruction, always ask your healthcare professional. [address] any warranty or liability for your use of this information. Introducing hospitals & HEALTH SERVICES! Meron Loving introduces [employer] patient portal. Now you can access parts of your medical record, email your doctor's office, and request medication refills online. 1. In your internet browser, go to [email or web address] Mobile 2. Click on the First Time User? Click Here link in the Sign In box. You will see the New Member Sign Up page. 3. Enter your [employer] Access Code exactly as it appears below. You will not need to use this code after youve completed the sign-up process. If you do not sign up before the expiration date, you must request a new code. · [employer] Access Code: [email or web address] Expires: 7/16/2018 12:00 PM 
 
 4. Enter the last four digits of your Social Security Number (xxxx) and Date of Birth (mm/dd/yyyy) as indicated and click Submit. You will be taken to the next sign-up page. 5. Create a [employer] ID. This will be your [employer] login ID and cannot be changed, so think of one that is secure and easy to remember. 6. Create a [employer] password. You can change your password at any time. 7. Enter your Password Reset Question and Answer. This can be used at a later time if you forget your password. 8. Enter your e-mail address. You will receive e-mail notification when new information is available in [address] 9. Click Sign Up. You can now view and download portions of your medical record. 10. Click the Download Summary menu link to download a portable copy of your medical information. If you have questions, please visit the Frequently Asked Questions section of the [employer] website. Remember, [employer] is NOT to be used for urgent needs. For medical emergencies, dial 911. Now available from your iPhone and Android! Please provide this summary of care documentation to your next provider. Your primary care clinician is listed as Phys Other. If you have any questions after today's visit, please call [phone].

## 2018-04-30 NOTE — PROGRESS NOTES
Gave the patient a DocRx application to use if for some reason his employment health insurance does not come though (pt should be eligible as of tomorrow for health insurance). Reviewed AVS with pt and advised him how to apply for a Care Card to cover his recent hospitalization.

## 2018-04-30 NOTE — PROGRESS NOTES
Charles Lora is a 50 y.o. male    Issues discussed today include:    Chief Complaint   Patient presents with   [address] Follow up        1) ER f/u:  Was in ER 3 d ago for chest pain. He was recently hospitalized and dx'd w DVT, PE and pulmonary infarct 2 wks ago. He is on eliquis for this. He had acute R sided CP with nausea and dyspnea, so went to ER. They did labs, LE duplex and repeat CTA chest. Hematology was consulted in the ED and felt his sxs (CP and mild hemoptysis) would be explained by pulm embolism and infarct. They recommended d/c w office f/u. Pt is feeling better. On eliquis and prn oxycodone for pain. No SOB or CP today. Data reviewed or ordered today:       Other problems include:  Patient Active Problem List   Diagnosis Code    Pulmonary infarct ([facility] [address] I26.99    Acute pulmonary embolism (HCC) I26.99    Elevated hemoglobin (HCC) D58.2    Acute deep vein thrombosis of distal leg, right (HCC) I82.4Z1       Medications:  Current Outpatient Prescriptions   Medication Sig Dispense Refill    apixaban (ELIQUIS) 5 mg tablet Take 1 Tab by mouth two (2) times a day. Via PAP 60 Tab 1    oxyCODONE IR (ROXICODONE) 10 mg tab immediate release tablet Take 1 Tab by mouth every four (4) hours as needed. Max Daily Amount: 60 mg. 30 Tab 0       Allergies:  No Known Allergies    LMP:  No LMP for male patient.     Social History     Social History    Marital status: SINGLE     Spouse name: N/A    Number of children: N/A    Years of education: N/A     Occupational History          [facility] History Main Topics    Smoking status: Never Smoker    Smokeless tobacco: Never Used    Alcohol use Yes      Comment: Once a week, < 4 drinks in a sitting    Drug use: No    Sexual activity: Yes     Partners: Female     Other Topics Concern    Not on file     Social History Narrative       Family History   Problem Relation Age of Onset    COPD Mother  Lung Disease Mother 59     lung cancer    Heart Attack Father 47      of MI    Coronary Artery Disease Sister     No Known Problems Brother     No Known Problems Daughter     No Known Problems Son     No Known Problems Son          Physical Exam   Visit Vitals    /84 (BP 1 Location: Left arm, BP Patient Position: Sitting)    Pulse 72    Temp 98.8 °F (37.1 °C) (Oral)    Wt 266 lb (120.7 kg)    BMI 32.38 kg/m2      BP Readings from Last 3 Encounters:   18 141/86   18 126/84   18 141/84     Constitutional: Appears well,  No acute distress, Vitals noted  Psychiatric:  Affect normal, Alert and Oriented to person/place/time  Eyes:  Conjunctiva clear, no drainage  ENT:  External ears and nose normal, Mucous membranes moist  Neck:  General inspection normal. Supple. Heart:  Normal HR, Normal S1 and S2,  Regular rhythm. No murmurs, rubs or gallops. Lungs:  Clear to auscultation, good respiratory effort, no wheezes, rales or rhonchi  Extremities: Without edema, good peripheral pulses  Skin:  Warm to palpation, without rashes      Assessment/Plan:      ICD-10-CM ICD-9-CM    1. Other acute pulmonary embolism without acute cor pulmonale (HCC) I26.99 415.19 apixaban (ELIQUIS) 5 mg tablet   2. Pulmonary infarct ([address] I26.99 415.19    3. Acute deep vein thrombosis of distal leg, right (HCC) I82.4Z1 453.42 apixaban (ELIQUIS) 5 mg tablet       Hospital records reviewed including provider notes, labs and imaging  Pt stable today, continue current tx plan  Hematology recommends lifelong [address] if tolerated, has upcoming appt with heme on 18    Follow-up Disposition:  Return in about 6 weeks (around 2018) for PE/DVT follow up appt.         Joe Gonzales MD  [address] [place] [address], [facility]

## 2018-05-01 ENCOUNTER — OFFICE VISIT (OUTPATIENT)
Dept: ONCOLOGY | Age: 48
End: 2018-05-01

## 2018-05-01 VITALS
SYSTOLIC BLOOD PRESSURE: 141 MMHG | TEMPERATURE: 98 F | RESPIRATION RATE: 20 BRPM | OXYGEN SATURATION: 95 % | HEART RATE: 82 BPM | DIASTOLIC BLOOD PRESSURE: 86 MMHG | HEIGHT: 76 IN | BODY MASS INDEX: 32.05 KG/M2 | WEIGHT: 263.2 LBS

## 2018-05-01 DIAGNOSIS — I26.99 OTHER ACUTE PULMONARY EMBOLISM WITHOUT ACUTE COR PULMONALE (HCC): Primary | ICD-10-CM

## 2018-05-01 DIAGNOSIS — I26.99 PULMONARY INFARCT (HCC): ICD-10-CM

## 2018-05-01 DIAGNOSIS — I82.4Z1 ACUTE DEEP VEIN THROMBOSIS OF DISTAL LEG, RIGHT (HCC): ICD-10-CM

## 2018-05-01 NOTE — MR AVS SNAPSHOT
2700 [facility] 209 1400 [address] [place] [address] 
[phone] Patient: Pineda Delcid MRN: [id number] ONN:[id number] Visit Information Date & Time Provider Department Dept. Phone Encounter #  
 5/1/2018  2:30 PM Mally Martinez  [facility] [place] [id number] [id number] Your Appointments 5/8/2018  2:00 PM  
New Patient with Daniel Aleman MD  
[facility] Internal Medicine [address]) Appt Note: GRAY- Np Est. PCP f/up from [facility] seen on 04/27/18 for Pain in lungs and vomiting blood. BDW 4/30/18  
 [facility] A [facility] [address] [place] [address] [address] [facility] [address]  
  
    
 6/21/2018  1:55 PM  
ROUTINE CARE with Johanne Cobian MD  
[facility] 3651 [facility]) Appt Note: fu dvt/pe [address]  
  
   
 1516 [facility] 7/10/2018  9:00 AM  
New Patient with Ramos Hillman MD  
[address] [facility]) Appt Note: Np referral from Hospital for DVT  
 [address] [facility] 209 [facility] 7 [address]  
[phone]  
  
   
 [address] [facility] [address] Upcoming Health Maintenance Date Due DTaP/Tdap/Td series (1 - Tdap) 1/7/1991 Influenza Age 5 to Adult 8/1/2018 Allergies as of 5/1/2018  Review Complete On: 5/1/2018 By: Mally Martinez NP No Known Allergies Current Immunizations  Never Reviewed No immunizations on file. Not reviewed this visit You Were Diagnosed With   
  
 Codes Comments Other acute pulmonary embolism without acute cor pulmonale (HCC)    -  Primary ICD-10-CM: I26.99 
ICD-9-CM: 415.19 Pulmonary infarct [facility])     ICD-10-CM: I26.99 
ICD-9-CM: 415.19 Acute deep vein thrombosis of distal leg, right (HCC)     ICD-10-CM: I82.4Z1 ICD-9-CM: 453.42   
  
 Vitals BP Pulse Temp Resp Height(growth percentile) Weight(growth percentile) 141/86 82 98 °F (36.7 °C) 20 6' 4\" (1.93 m) 263 lb 3.2 oz (119.4 kg) SpO2 BMI Smoking Status 95% 32.04 kg/m2 Never Smoker Vitals History BMI and BSA Data Body Mass Index Body Surface Area 32.04 kg/m 2 2.53 m 2 Preferred Pharmacy Pharmacy Name Phone [facility] [address] [place] [address], [address] [place] [address] [phone] Your Updated Medication List  
  
   
This list is accurate as of 5/1/18  3:22 PM.  Always use your most recent med list.  
  
  
  
  
 apixaban 5 mg tablet Commonly known as:  Donnise Or Take 1 Tab by mouth two (2) times a day. Via PAP  
  
 oxyCODONE IR 10 mg Tab immediate release tablet Commonly known as:  Wyvonnia Collado Take 1 Tab by mouth every four (4) hours as needed. Max Daily Amount: 60 mg. Patient Instructions Apixaban (By mouth) Apixaban (a-PIX-a-ban) Treats and prevents blood clots. This medicine is a blood thinner. Brand Name(s): Eliquis There may be other brand names for this medicine. When This Medicine Should Not Be Used: This medicine is not right for everyone. Do not use it if you had an allergic reaction to apixaban or you have active bleeding. How to Use This Medicine:  
Tablet · Your doctor will tell you how much medicine to use. Do not use more than directed. · If you are not able to swallow the tablets whole, they may be crushed and mixed in water, 5% dextrose in water (D5W), apple juice, or applesauce. The crushed tablets may be mixed with 60 mL of water or D5W dose and given through a nasogastric tube (NGT). · This medicine should come with a Medication Guide. Ask your pharmacist for a copy if you do not have one. · Missed dose: Take a dose as soon as you remember. If it is almost time for your next dose, wait until then and take a regular dose.  Do not take extra medicine to make up for a missed dose. · Store the medicine in a closed container at room temperature, away from heat, moisture, and direct light. Drugs and Foods to Avoid: Ask your doctor or pharmacist before using any other medicine, including over-the-counter medicines, vitamins, and herbal products. · Some medicines can affect how apixaban works. Tell your doctor if you are using any of the following: ¨ Carbamazepine, clarithromycin, itraconazole, ketoconazole, phenytoin, rifampin, ritonavir, Toni's wort ¨ Blood thinner (including clopidogrel, heparin, prasugrel, warfarin) ¨ Medicine to treat depression ¨ NSAID pain or arthritis medicine (including aspirin, celecoxib, diclofenac, ibuprofen, naproxen) Warnings While Using This Medicine: · Tell your doctor if you are pregnant or breastfeeding, or if you have kidney disease, liver disease, bleeding problems, or an artificial heart valve. · Do not stop using this medicine suddenly without asking your doctor. You might have a higher risk of stroke for a short time after you stop using this medicine. · This medicine increases your risk for bleeding that can become serious if not controlled. You may also bruise easily, and it may take longer than usual for bleeding to stop. · This medicine may increase your risk for blood clots in your spine or back if you undergo an epidural or spinal puncture. This could lead to paralysis. Tell your doctor if you ever had spine problems or back surgery. · Tell any doctor or dentist who treats you that you are using this medicine. With your doctor's supervision, you may need to stop using this medicine several days before you have surgery or medical tests. · Your doctor will do lab tests at regular visits to check on the effects of this medicine. Keep all appointments. · Keep all medicine out of the reach of children. Never share your medicine with anyone. Possible Side Effects While Using This Medicine: Call your doctor right away if you notice any of these side effects: · Allergic reaction: Itching or hives, swelling in your face or hands, swelling or tingling in your mouth or throat, chest tightness, trouble breathing · Change in how much or how often you urinate, red or pink urine · Chest pain, trouble breathing · Coughing up blood, vomiting blood or material that looks like coffee grounds · Numbness, tingling, or muscle weakness in your legs or feet · Red or black, tarry stools · Unusual bleeding, bruising, or weakness If you notice other side effects that you think are caused by this medicine, tell your doctor. Call your doctor for medical advice about side effects. You may report side effects to FDA at 1-764-ZBO-1134 © 2017 [facility] Information is for End User's use only and may not be sold, redistributed or otherwise used for commercial purposes. The above information is an  only. It is not intended as medical advice for individual conditions or treatments. Talk to your doctor, nurse or pharmacist before following any medical regimen to see if it is safe and effective for you. Introducing hospitals & HEALTH SERVICES! [employer] introduces [employer] patient portal. Now you can access parts of your medical record, email your doctor's office, and request medication refills online. 1. In your internet browser, go to [email or web address]. [facility] [employer] [facility]/[employer] ([place])t 2. Click on the First Time User? Click Here link in the Sign In box. You will see the New Member Sign Up page. 3. Enter your Clout Access Code exactly as it appears below. You will not need to use this code after youve completed the sign-up process. If you do not sign up before the expiration date, you must request a new code. · Clout Access Code: [email or web address] Expires: 7/16/2018 12:00 PM 
 
4.  Enter the last four digits of your Social Security Number (xxxx) and Date of Birth (mm/dd/yyyy) as indicated and click Submit. You will be taken to the next sign-up page. 5. Create a [employer] ID. This will be your [employer] login ID and cannot be changed, so think of one that is secure and easy to remember. 6. Create a [employer] password. You can change your password at any time. 7. Enter your Password Reset Question and Answer. This can be used at a later time if you forget your password. 8. Enter your e-mail address. You will receive e-mail notification when new information is available in [address] 9. Click Sign Up. You can now view and download portions of your medical record. 10. Click the Download Summary menu link to download a portable copy of your medical information. If you have questions, please visit the Frequently Asked Questions section of the [employer] website. Remember, [employer] is NOT to be used for urgent needs. For medical emergencies, dial 911. Now available from your iPhone and Android! Please provide this summary of care documentation to your next provider. Your primary care clinician is listed as Phys Other. If you have any questions after today's visit, please call [phone].

## 2018-05-01 NOTE — PATIENT INSTRUCTIONS
Apixaban (By mouth)   Apixaban (a-PIX-a-ban)  Treats and prevents blood clots. This medicine is a blood thinner. Brand Name(s): Eliquis   There may be other brand names for this medicine. When This Medicine Should Not Be Used: This medicine is not right for everyone. Do not use it if you had an allergic reaction to apixaban or you have active bleeding. How to Use This Medicine:   Tablet  · Your doctor will tell you how much medicine to use. Do not use more than directed. · If you are not able to swallow the tablets whole, they may be crushed and mixed in water, 5% dextrose in water (D5W), apple juice, or applesauce. The crushed tablets may be mixed with 60 mL of water or D5W dose and given through a nasogastric tube (NGT). · This medicine should come with a Medication Guide. Ask your pharmacist for a copy if you do not have one. · Missed dose: Take a dose as soon as you remember. If it is almost time for your next dose, wait until then and take a regular dose. Do not take extra medicine to make up for a missed dose. · Store the medicine in a closed container at room temperature, away from heat, moisture, and direct light. Drugs and Foods to Avoid:   Ask your doctor or pharmacist before using any other medicine, including over-the-counter medicines, vitamins, and herbal products. · Some medicines can affect how apixaban works. Tell your doctor if you are using any of the following:   ¨ Carbamazepine, clarithromycin, itraconazole, ketoconazole, phenytoin, rifampin, ritonavir, Toni's wort  ¨ Blood thinner (including clopidogrel, heparin, prasugrel, warfarin)  ¨ Medicine to treat depression  ¨ NSAID pain or arthritis medicine (including aspirin, celecoxib, diclofenac, ibuprofen, naproxen)  Warnings While Using This Medicine:   · Tell your doctor if you are pregnant or breastfeeding, or if you have kidney disease, liver disease, bleeding problems, or an artificial heart valve.   · Do not stop using this medicine suddenly without asking your doctor. You might have a higher risk of stroke for a short time after you stop using this medicine. · This medicine increases your risk for bleeding that can become serious if not controlled. You may also bruise easily, and it may take longer than usual for bleeding to stop. · This medicine may increase your risk for blood clots in your spine or back if you undergo an epidural or spinal puncture. This could lead to paralysis. Tell your doctor if you ever had spine problems or back surgery. · Tell any doctor or dentist who treats you that you are using this medicine. With your doctor's supervision, you may need to stop using this medicine several days before you have surgery or medical tests. · Your doctor will do lab tests at regular visits to check on the effects of this medicine. Keep all appointments. · Keep all medicine out of the reach of children. Never share your medicine with anyone. Possible Side Effects While Using This Medicine:   Call your doctor right away if you notice any of these side effects:  · Allergic reaction: Itching or hives, swelling in your face or hands, swelling or tingling in your mouth or throat, chest tightness, trouble breathing  · Change in how much or how often you urinate, red or pink urine  · Chest pain, trouble breathing  · Coughing up blood, vomiting blood or material that looks like coffee grounds  · Numbness, tingling, or muscle weakness in your legs or feet  · Red or black, tarry stools  · Unusual bleeding, bruising, or weakness  If you notice other side effects that you think are caused by this medicine, tell your doctor. Call your doctor for medical advice about side effects. You may report side effects to FDA at 1-032-FDA-3031  © 2017 [employer] Information is for End User's use only and may not be sold, redistributed or otherwise used for commercial purposes. The above information is an  only. It is not intended as medical advice for individual conditions or treatments. Talk to your doctor, nurse or pharmacist before following any medical regimen to see if it is safe and effective for you.

## 2018-05-01 NOTE — PROGRESS NOTES
[facility] at [address]  [address], [place]: [phone]  F: [phone]    Reason for Visit:   Talat Mendoza is a 50 y.o. male who is seen in follow up of new DVT/PE. Treatment History:   · CTA 4/17/18 with pulmonary emboli of right lung  · CT of abd/pelvis 4/17/18 with bilateral non-obstructing renal calculi, no other acute abnormality. · Doppler right leg 4/17/18 consistent with acute DVT right popliteal vein. · 4/20/18 CTA with stable appearance of PE    History of Present Illness:   Mr. Jerome aHwkins is 50 y.o. male with no significant past medical history who presented to ED on 4/17/18 with c/o one week history of right sided flank pain. Found to have right leg DVT and right lung PE. Initially symptoms associated with nausea, low appetite. Has also noticed in early Feb right leg discomfort behind right knee with associated leg weakness.      Travels for work frequently. Has flown back and forth from [facility] (4 hours total travel time) every 2 weeks from August to Feb. Around Feb 19th he drove back and forth from [place]. Noticed pain in right leg prior to [place] trip.      Active with work, works full time. Walks 4-9 miles per day on the job.      Has twin brother who is healthy. Sister, age 48, has pacemaker and cardiac illness thought to be related to heavy smoking. Has 3 children ages 7-14 who are all healthy.      No personal history of blood clots, easily bruising or bleeding. Denies blood in urine or stool. No change in bowel habits or recent weight loss. No dark/black or blood in stools. History of tonsillectomy as a child without incident.      Drinks alcohol about 2-3 glasses of wine 2-3 days a week. Denies active smoking or illicit drug use. Denies use of testosterone replacement therapy. Presents today for post ED follow up.      Past Medical History:   Diagnosis Date    Anxiety     Kidney stone     Pulmonary embolism ([facility] [address] Past Surgical History:   Procedure Laterality Date    HX TONSILLECTOMY        Social History   Substance Use Topics    Smoking status: Never Smoker    Smokeless tobacco: Never Used    Alcohol use Yes      Comment: Once a week, < 4 drinks in a sitting      Family History   Problem Relation Age of Onset    COPD Mother    [facility] Lung Disease Mother 59     lung cancer    Heart Attack Father 47      of MI    Coronary Artery Disease Sister     No Known Problems Brother     No Known Problems Daughter     No Known Problems Son     No Known Problems Son      Current Outpatient Prescriptions   Medication Sig    apixaban (ELIQUIS) 5 mg tablet Take 1 Tab by mouth two (2) times a day. Via PAP    oxyCODONE IR (ROXICODONE) 10 mg tab immediate release tablet Take 1 Tab by mouth every four (4) hours as needed. Max Daily Amount: 60 mg. No current facility-administered medications for this visit. No Known Allergies     Review of Systems: A complete review of systems was obtained, negative except as described above. Physical Exam:     Visit Vitals    /86    Pulse 82    Temp 98 °F (36.7 °C)    Resp 20    Ht 6' 4\" (1.93 m)    Wt 263 lb 3.2 oz (119.4 kg)    SpO2 95%    BMI 32.04 kg/m2     ECOG PS 0    General: No distress  Eyes: PERRLA, anicteric sclerae  HENT: Atraumatic, OP clear  Neck: Supple  Lymphatic: No cervical, supraclavicular adenopathy  Respiratory: CTAB, normal respiratory effort  CV: Normal rate, regular rhythm, no peripheral edema noted  GI: Soft, nontender, nondistended  MS: Digits without clubbing or cyanosis. Skin: No rashes, ecchymoses, or petechiae. Normal temperature, turgor, and texture.   Psych: Alert, oriented, appropriate affect, normal judgment/insight    Results:     Lab Results   Component Value Date/Time    WBC 7.5 2018 11:05 AM    HGB 16.6 2018 11:05 AM    HCT 52.1 (H) 2018 11:05 AM    PLATELET 812  11:05 AM    MCV 92.2 2018 11:05 AM ABS. NEUTROPHILS 5.2 04/27/2018 11:05 AM     Lab Results   Component Value Date/Time    Sodium 138 04/27/2018 11:05 AM    Potassium 4.0 04/27/2018 11:05 AM    Chloride 103 04/27/2018 11:05 AM    CO2 27 04/27/2018 11:05 AM    Glucose 87 04/27/2018 11:05 AM    BUN 14 04/27/2018 11:05 AM    Creatinine 0.81 04/27/2018 11:05 AM    GFR est AA >60 04/27/2018 11:05 AM    GFR est non-AA >60 04/27/2018 11:05 AM    Calcium 8.9 04/27/2018 11:05 AM     Lab Results   Component Value Date/Time    Bilirubin, total 0.7 04/27/2018 11:05 AM    ALT (SGPT) 38 04/27/2018 11:05 AM    AST (SGOT) 19 04/27/2018 11:05 AM    Alk. phosphatase 84 04/27/2018 11:05 AM    Protein, total 7.1 04/27/2018 11:05 AM    Albumin 3.5 04/27/2018 11:05 AM    Globulin 3.6 04/27/2018 11:05 AM       Records reviewed and summarized above. Radiology report(s) reviewed above. JAK2 negative     APLA negative     Factor II, FVL, ATIII, All unremarkable     Prior CT CAP without evidence of malignancy     Records reviewed and summarized above.     Duplex 4/27/18     IMPRESSION:  Evidence of  deep vein thrombosis, as described above. In comparison to the previous exam on 4/17/2018, there is no evidence  of new thrombosis on today's exam    Assessment:   1) DVT/PE     Acute DVT of right leg and two PE of right lung with CTA done 4/17/18. This was considered unprovoked as the provoking factor (travel) was considered weak. His work up neither revealed a thrombophilia nor an underlying malignancy. Indefinite AC recommended as long as risk outweighs benefit    Repeat CTA 4/20/18 with no acute change. Done in ED due to persistent chest pain. Repeat dopplers 4/27/18 with stable clot.      Continue Eliquis at the current dose of 5 mg BID     2) Hemoptysis. Improving    3) Chest pain  Secondary to known infarct. Improving      Plan:     · Continue Eliquis. · Follow up in 3 months.      I appreciate the opportunity to participate in Mr. Samanta Rogers care.    Patient was seen with Santiago Barth NP        Signed By: Mustapha Burgess MD

## 2018-05-01 NOTE — PROGRESS NOTES
Pineda Delcid is a 50 y.o. male here today for follow up, PE. Patient reports continues to have daily episodes of coughing up small amounts of blood. This has improved since recent ED visit. Patient notes that he is tiring quickly. Continues with some discomfort to right lower ext.

## 2021-01-01 NOTE — ED NOTES
Pt discharged with all personal belonging, prescriptions, and discharge instructions. Pt states understanding and denies any further needs/ concerns at this time. Pt ambulatory from ED. [id number]